# Patient Record
Sex: FEMALE | Race: BLACK OR AFRICAN AMERICAN | Employment: UNEMPLOYED | URBAN - METROPOLITAN AREA
[De-identification: names, ages, dates, MRNs, and addresses within clinical notes are randomized per-mention and may not be internally consistent; named-entity substitution may affect disease eponyms.]

---

## 2017-09-29 ENCOUNTER — HOSPITAL ENCOUNTER (EMERGENCY)
Age: 22
Discharge: HOME | End: 2017-09-29
Attending: OBSTETRICS & GYNECOLOGY | Admitting: OBSTETRICS & GYNECOLOGY
Payer: COMMERCIAL

## 2017-09-29 VITALS
HEART RATE: 94 BPM | DIASTOLIC BLOOD PRESSURE: 58 MMHG | BODY MASS INDEX: 50.05 KG/M2 | HEIGHT: 62 IN | SYSTOLIC BLOOD PRESSURE: 104 MMHG | TEMPERATURE: 98.5 F | RESPIRATION RATE: 20 BRPM | WEIGHT: 272 LBS

## 2017-09-29 PROCEDURE — 99283 EMERGENCY DEPT VISIT LOW MDM: CPT

## 2017-09-29 PROCEDURE — 59025 FETAL NON-STRESS TEST: CPT

## 2017-09-29 PROCEDURE — 76815 OB US LIMITED FETUS(S): CPT

## 2017-09-29 RX ORDER — LABETALOL 200 MG/1
200 TABLET, FILM COATED ORAL 2 TIMES DAILY
COMMUNITY
End: 2017-10-27

## 2017-09-29 NOTE — PROGRESS NOTES
46 Pt presented ambulatory sent by office for NST because baby A was hard to find in office. 1137  Shown to bathroom to change into gown and to provide urine specimen. 1150 Connected to external monitors. Plan and goals of nursing care discussed with patient and family - verbalized appropriate understanding and goals of care mutually agreed upon. 1215 Unable to confirm  Tracing two separate babies. Continual searching. [address] on unit. Notified despite multiple attempts I am not certain I am tracing different babies. In room. 1235 Ultrasound performed by CNM. 1248  NST begun. 1326 NST reviewed by CNM. Blood pressures reviewed. Discharge order received. 1334 Written discharge orders provided and reviewed - pt verbalized appropriate understanding. [id number] [id number] Discharged home ambulatory in [facility].

## 2017-09-29 NOTE — IP AVS SNAPSHOT
[address] [address] 
[phone] Patient: Daniela Amador MRN: [id number] :1995 You are allergic to the following No active allergies Recent Documentation Height Weight BMI OB Status Smoking Status 1.575 m 123.4 kg 49.75 kg/m2 Pregnant Former Smoker Emergency Contacts Name Discharge Info Relation Home Work Mobile Betty Barber [1] [phone] About your hospitalization You were admitted on:  N/A You last received care in the:  [facility] L&D TRIAGE You were discharged on:  2017 Unit phone number:  [phone] Why you were hospitalized Your primary diagnosis was:  Not on File Providers Seen During Your Hospitalizations Provider Role Specialty Primary office phone Kg Mccollum MD Attending Provider Obstetrics & Gynecology [phone] Your Primary Care Physician (PCP) Primary Care Physician Office Phone Office Fax Haider Kim [phone] Follow-up Information Follow up With Details Comments Contact Info Kg Mccollum MD   [address] [place] [address] 150 
[phone] Kg Mccollum MD  Keep next scheduled appointment and call as needed. [address] [place] [address] 150 
[phone] Current Discharge Medication List  
  
ASK your doctor about these medications Dose & Instructions Dispensing Information Comments Morning Noon Evening Bedtime  
 labetalol 200 mg tablet Commonly known as:  Carolyn Gala Your last dose was: Your next dose is:    
   
   
 Dose:  200 mg Take 200 mg by mouth two (2) times a day. Refills:  0 Discharge Instructions High Blood Pressure in Pregnancy: Care Instructions Your Care Instructions High blood pressure (hypertension) means that the force of blood against your artery walls is too strong. Mild high blood pressure during pregnancy is not usually dangerous. Your doctor will probably just want to watch you closely. But when blood pressure is very high, it can reduce oxygen to your baby. This can affect how well your baby grows. High blood pressure also means that you are at higher risk for: · Preeclampsia. This is a problem that includes high blood pressure and damage to your liver or kidneys. It can also reduce how much oxygen your baby gets. In some cases, it leads to eclampsia. Eclampsia causes seizures. · Placental abruption. This is a problem when the placenta separates from the uterus before birth. It prevents the baby from getting enough oxygen and nutrients. Sometimes it can cause death for the baby and the mother. To prevent problems for you or your baby, you will have to check your blood pressure often. You will do this until after your baby is born. If your blood pressure rises suddenly or is very high during your pregnancy, your doctor may prescribe medicines. They can usually control blood pressure. If your blood pressure affects your or your baby's health, your doctor may need to deliver your baby early. After your baby is born, your blood pressure will probably improve. But sometimes blood pressure problems continue after birth. Follow-up care is a key part of your treatment and safety. Be sure to make and go to all appointments, and call your doctor if you are having problems. It's also a good idea to know your test results and keep a list of the medicines you take. How can you care for yourself at home? · Take and write down your blood pressure at home if your doctor tells you to. · Take your medicines exactly as prescribed. Call your doctor if you think you are having a problem with your medicine. · Do not smoke.  If you need help quitting, talk to your doctor about stop-smoking programs and medicines. These can increase your chances of quitting for good. · Do not gain too much weight during your pregnancy. Talk to your doctor about how much weight gain is healthy. · Eat a healthy diet. · Don't use a lot of salt. Take the salt shaker off the table. · Get regular, mild exercise. Walking or swimming several times a week can be healthy for you and your baby. · Reduce stress, and find time to relax. This is very important if you continue to work or have a busy schedule. It's also important if you have small children at home. When should you call for help? Call 911 anytime you think you may need emergency care. For example, call if: 
· You passed out (lost consciousness). · You have a seizure. Call your doctor now or seek immediate medical care if: 
· You have symptoms of preeclampsia, such as: 
¨ Sudden swelling of your face, hands, or feet. ¨ New vision problems (such as dimness or blurring). ¨ A severe headache. · Your blood pressure is higher than it should be or rises suddenly. · You have new nausea or vomiting. · You think that you are in labor. · You have new belly pain. Watch closely for changes in your health, and be sure to contact your doctor if: 
· You gain weight rapidly. Where can you learn more? Go to http://lexis-allen.info/. Enter [phone] in the search box to learn more about \"High Blood Pressure in Pregnancy: Care Instructions. \" Current as of: March 16, 2017 Content Version: 11.3 © 2139-3129 [employer]. Care instructions adapted under license by [employer] (which disclaims liability or warranty for this information). If you have questions about a medical condition or this instruction, always ask your healthcare professional. [place] [address] any warranty or liability for your use of this information. Weeks 32 to 34 of Your Pregnancy: Care Instructions Your Care Instructions During the last few weeks of your pregnancy, you may have more aches and pains. It's important to rest when you can. Your growing baby is putting more pressure on your bladder. So you may need to urinate more often. Hemorrhoids are also common. These are painful, itchy veins in the rectal area. In the 36th week, most women have a test for group B streptococcus (GBS). GBS is a common bacteria that can live in the vagina and rectum. It can make your baby sick after birth. If you test positive, you will get antibiotics during labor. These will keep your baby from getting the bacteria. You may want to talk with your doctor about banking your baby's umbilical cord blood. This is the blood left in the cord after birth. If you want to save this blood, you must arrange it ahead of time. You can't decide at the last minute. If you haven't already had the Tdap shot during this pregnancy, talk to your doctor about getting it. It will help protect your  against pertussis infection. Follow-up care is a key part of your treatment and safety. Be sure to make and go to all appointments, and call your doctor if you are having problems. It's also a good idea to know your test results and keep a list of the medicines you take. How can you care for yourself at home? Ease hemorrhoids · Get more liquids, fruits, vegetables, and fiber in your diet. This will help keep your stools soft. · Avoid sitting for too long. Lie on your left side several times a day. · Clean yourself with soft, moist toilet paper. Or you can use witch hazel pads or personal hygiene pads. · If you are uncomfortable, try ice packs. Or you can sit in a warm sitz bath. Do these for 20 minutes at a time, as needed. · Use hydrocortisone cream for pain and itching. Two examples are Anusol and Preparation H Hydrocortisone. · Ask your doctor about taking an over-the-counter stool softener. Consider breastfeeding · Experts recommend that women breastfeed for 1 year or longer. Breast milk is the perfect food for babies. · Breast milk is easier for babies to digest than formula. And it is always available, just the right temperature, and free. · In general, babies who are  are healthier than formula-fed babies. ¨  babies are less likely to get ear infections, colds, diarrhea, and pneumonia. ¨  babies who are fed only breast milk are less likely to get asthma and allergies. ¨  babies are less likely to be obese. ¨  babies are less likely to get diabetes or heart disease. · Women who breastfeed have less bleeding after the birth. Their uteruses also shrink back faster. · Some women who breastfeed lose weight faster. Making milk burns calories. · Breastfeeding can lower your risk of breast cancer, ovarian cancer, and osteoporosis. Decide about circumcision for boys · As you make this decision, it may help to think about your personal, Jainism, and family traditions. You get to decide if you will keep your son's penis natural or if he will be circumcised. · If you decide that you would like to have your baby circumcised, talk with your doctor. You can share your concerns about pain. And you can discuss your preferences for anesthesia. Where can you learn more? Go to http://lexis-allen.info/. Enter S316 in the search box to learn more about \"Weeks 32 to 34 of Your Pregnancy: Care Instructions. \" Current as of: March 16, 2017 Content Version: 11.3 © 6277-0277 [employer]. Care instructions adapted under license by [employer] (which disclaims liability or warranty for this information). If you have questions about a medical condition or this instruction, always ask your healthcare professional. [place] [address] any warranty or liability for your use of this information. Learning About Twin Pregnancy What is different about a twin pregnancy? In many ways, a twin pregnancy is like a single-baby pregnancy. Healthy twins develop like a single baby does until the last trimester, when their growth slows down. Twins are usually born before the usual 40-week due date. For the mother, carrying twins can be more difficult than carrying a single baby. And her risks are higher for pregnancy problems. That's why keeping up with prenatal checks and tests is especially important. How do twins grow? Twins develop from embryos to babies like a single baby does. · By weeks 10 to 14 of your pregnancy, one or two placentas have formed inside your uterus. It is possible to hear your babies' heartbeats with a special ultrasound device. Your babies' eyes can move. Their arms and legs can bend. · Around weeks 14 to 18, hair is beginning to grow on your babies' heads. · By 18 to 22 weeks, your babies can now suck their thumbs and  firmly with their hands. They can open and close their eyelids. Around this time, you may start to feel your babies move. At first, this might feel like fluttering or \"butterflies. \" 
· Around week 26, you may notice that your babies respond to the sound of your or your partner's voice. You may also notice that they do less turning and twisting and more squirming. · Up to 32 weeks, twins grow rapidly. By this point, they really start to look like babies, with hair and plump skin. · Around 32 to 34 weeks, twins' pace of growth slows down. Their lungs become more ready for breathing. This marks a stage when babies born early are less likely to have breathing problems after birth. What can you expect during a twin pregnancy? With a twin pregnancy, your body makes high levels of pregnancy hormones. So morning sickness may come on earlier and stronger than if you were carrying a single baby.  You may also have earlier and more intense symptoms from pregnancy, like swelling, heartburn, leg cramps, bladder discomfort, and sleep problems. Your belly may grow bigger, and you may gain more weight, sooner. Talk to your doctor about how much you might expect to gain. When you're carrying twins, you and your babies may be tested and checked more than you would for a single-baby pregnancy. · At about 10 weeks, an ultrasound can show if the babies are growing in the same amniotic sac. If they each have their own sac and their own placenta, twins have the best chances of both growing well. · Between weeks 18 and 20, an ultrasound may be able to show the sex of your babies. · Later in your pregnancy, you will start to have checkups more often. This will be sooner than for a single-baby pregnancy. Twins tend to be born early, but 37 weeks is a goal when mother and babies are doing well. As you get closer to delivery time, your medical team will help you know what to expect and what to do. As questions come to mind, keep a list so you can remember to ask your doctor. Follow-up care is a key part of your treatment and safety. Be sure to make and go to all appointments, and call your doctor if you are having problems. It's also a good idea to know your test results and keep a list of the medicines you take. Where can you learn more? Go to http://lexis-allen.info/. Enter A910 in the search box to learn more about \"Learning About Twin Pregnancy. \" Current as of: May 30, 2016 Content Version: 11.3 © 8707-0472 [employer]. Care instructions adapted under license by [employer] (which disclaims liability or warranty for this information). If you have questions about a medical condition or this instruction, always ask your healthcare professional. [place] [address] any warranty or liability for your use of this information. Discharge Orders None Introducing \[facility]\"" & HEALTH SERVICES! Cherry Aguirre introduces [employer] patient portal. Now you can access parts of your medical record, email your doctor's office, and request medication refills online. 1. In your internet browser, go to [email or web address] 2. Click on the First Time User? Click Here link in the Sign In box. You will see the New Member Sign Up page. 3. Enter your [employer] Access Code exactly as it appears below. You will not need to use this code after youve completed the sign-up process. If you do not sign up before the expiration date, you must request a new code. · [employer] Access Code: [email or web address] Expires: 12/28/2017  1:31 PM 
 
4. Enter the last four digits of your Social Security Number (xxxx) and Date of Birth (mm/dd/yyyy) as indicated and click Submit. You will be taken to the next sign-up page. 5. Create a [employer] ID. This will be your [employer] login ID and cannot be changed, so think of one that is secure and easy to remember. 6. Create a [employer] password. You can change your password at any time. 7. Enter your Password Reset Question and Answer. This can be used at a later time if you forget your password. 8. Enter your e-mail address. You will receive e-mail notification when new information is available in [address] 9. Click Sign Up. You can now view and download portions of your medical record. 10. Click the Download Summary menu link to download a portable copy of your medical information. If you have questions, please visit the Frequently Asked Questions section of the [employer] website. Remember, [employer] is NOT to be used for urgent needs. For medical emergencies, dial 911. Now available from your iPhone and Android! General Information Please provide this summary of care documentation to your next provider. Patient Signature:  ____________________________________________________________ Date:  ____________________________________________________________  
  
Mercedes Polo Provider Signature:  ____________________________________________________________ Date:  ____________________________________________________________

## 2017-09-29 NOTE — IP AVS SNAPSHOT
[address] [address] 
[phone] Patient: James Betancur MRN: [id number] :1995 Current Discharge Medication List  
  
ASK your doctor about these medications Dose & Instructions Dispensing Information Comments Morning Noon Evening Bedtime  
 labetalol 200 mg tablet Commonly known as:  Shania Gibbs Your last dose was: Your next dose is:    
   
   
 Dose:  200 mg Take 200 mg by mouth two (2) times a day. Refills:  0

## 2017-09-29 NOTE — DISCHARGE INSTRUCTIONS
High Blood Pressure in Pregnancy: Care Instructions  Your Care Instructions  High blood pressure (hypertension) means that the force of blood against your artery walls is too strong. Mild high blood pressure during pregnancy is not usually dangerous. Your doctor will probably just want to watch you closely. But when blood pressure is very high, it can reduce oxygen to your baby. This can affect how well your baby grows. High blood pressure also means that you are at higher risk for:  · Preeclampsia. This is a problem that includes high blood pressure and damage to your liver or kidneys. It can also reduce how much oxygen your baby gets. In some cases, it leads to eclampsia. Eclampsia causes seizures. · Placental abruption. This is a problem when the placenta separates from the uterus before birth. It prevents the baby from getting enough oxygen and nutrients. Sometimes it can cause death for the baby and the mother. To prevent problems for you or your baby, you will have to check your blood pressure often. You will do this until after your baby is born. If your blood pressure rises suddenly or is very high during your pregnancy, your doctor may prescribe medicines. They can usually control blood pressure. If your blood pressure affects your or your baby's health, your doctor may need to deliver your baby early. After your baby is born, your blood pressure will probably improve. But sometimes blood pressure problems continue after birth. Follow-up care is a key part of your treatment and safety. Be sure to make and go to all appointments, and call your doctor if you are having problems. It's also a good idea to know your test results and keep a list of the medicines you take. How can you care for yourself at home? · Take and write down your blood pressure at home if your doctor tells you to. · Take your medicines exactly as prescribed.  Call your doctor if you think you are having a problem with your medicine. · Do not smoke. If you need help quitting, talk to your doctor about stop-smoking programs and medicines. These can increase your chances of quitting for good. · Do not gain too much weight during your pregnancy. Talk to your doctor about how much weight gain is healthy. · Eat a healthy diet. · Don't use a lot of salt. Take the salt shaker off the table. · Get regular, mild exercise. Walking or swimming several times a week can be healthy for you and your baby. · Reduce stress, and find time to relax. This is very important if you continue to work or have a busy schedule. It's also important if you have small children at home. When should you call for help? Call 911 anytime you think you may need emergency care. For example, call if:  · You passed out (lost consciousness). · You have a seizure. Call your doctor now or seek immediate medical care if:  · You have symptoms of preeclampsia, such as:  ¨ Sudden swelling of your face, hands, or feet. ¨ New vision problems (such as dimness or blurring). ¨ A severe headache. · Your blood pressure is higher than it should be or rises suddenly. · You have new nausea or vomiting. · You think that you are in labor. · You have new belly pain. Watch closely for changes in your health, and be sure to contact your doctor if:  · You gain weight rapidly. Where can you learn more? Go to http://lexis-allen.info/. Enter [phone] in the search box to learn more about \"High Blood Pressure in Pregnancy: Care Instructions. \"  Current as of: March 16, 2017  Content Version: 11.3  © 3431-6400 [employer], Incorporated. Care instructions adapted under license by [employer] (which disclaims liability or warranty for this information). If you have questions about a medical condition or this instruction, always ask your healthcare professional. [address] any warranty or liability for your use of this information. Weeks 32 to 34 of Your Pregnancy: Care Instructions  Your Care Instructions    During the last few weeks of your pregnancy, you may have more aches and pains. It's important to rest when you can. Your growing baby is putting more pressure on your bladder. So you may need to urinate more often. Hemorrhoids are also common. These are painful, itchy veins in the rectal area. In the 36th week, most women have a test for group B streptococcus (GBS). GBS is a common bacteria that can live in the vagina and rectum. It can make your baby sick after birth. If you test positive, you will get antibiotics during labor. These will keep your baby from getting the bacteria. You may want to talk with your doctor about banking your baby's umbilical cord blood. This is the blood left in the cord after birth. If you want to save this blood, you must arrange it ahead of time. You can't decide at the last minute. If you haven't already had the Tdap shot during this pregnancy, talk to your doctor about getting it. It will help protect your  against pertussis infection. Follow-up care is a key part of your treatment and safety. Be sure to make and go to all appointments, and call your doctor if you are having problems. It's also a good idea to know your test results and keep a list of the medicines you take. How can you care for yourself at home? Ease hemorrhoids  · Get more liquids, fruits, vegetables, and fiber in your diet. This will help keep your stools soft. · Avoid sitting for too long. Lie on your left side several times a day. · Clean yourself with soft, moist toilet paper. Or you can use witch hazel pads or personal hygiene pads. · If you are uncomfortable, try ice packs. Or you can sit in a warm sitz bath. Do these for 20 minutes at a time, as needed. · Use hydrocortisone cream for pain and itching. Two examples are Anusol and Preparation H Hydrocortisone.   · Ask your doctor about taking an over-the-counter stool softener. Consider breastfeeding  · Experts recommend that women breastfeed for 1 year or longer. Breast milk is the perfect food for babies. · Breast milk is easier for babies to digest than formula. And it is always available, just the right temperature, and free. · In general, babies who are  are healthier than formula-fed babies. ¨  babies are less likely to get ear infections, colds, diarrhea, and pneumonia. ¨  babies who are fed only breast milk are less likely to get asthma and allergies. ¨  babies are less likely to be obese. ¨  babies are less likely to get diabetes or heart disease. · Women who breastfeed have less bleeding after the birth. Their uteruses also shrink back faster. · Some women who breastfeed lose weight faster. Making milk burns calories. · Breastfeeding can lower your risk of breast cancer, ovarian cancer, and osteoporosis. Decide about circumcision for boys  · As you make this decision, it may help to think about your personal, Hoahaoism, and family traditions. You get to decide if you will keep your son's penis natural or if he will be circumcised. · If you decide that you would like to have your baby circumcised, talk with your doctor. You can share your concerns about pain. And you can discuss your preferences for anesthesia. Where can you learn more? Go to http://lexis-allen.info/. Enter B403 in the search box to learn more about \"Weeks 32 to 34 of Your Pregnancy: Care Instructions. \"  Current as of: March 16, 2017  Content Version: 11.3  © 1191-0605 [employer]. Care instructions adapted under license by [employer] (which disclaims liability or warranty for this information).  If you have questions about a medical condition or this instruction, always ask your healthcare professional. [place] [address] any warranty or liability for your use of this information. Learning About Twin Pregnancy  What is different about a twin pregnancy? In many ways, a twin pregnancy is like a single-baby pregnancy. Healthy twins develop like a single baby does until the last trimester, when their growth slows down. Twins are usually born before the usual 40-week due date. For the mother, carrying twins can be more difficult than carrying a single baby. And her risks are higher for pregnancy problems. That's why keeping up with prenatal checks and tests is especially important. How do twins grow? Twins develop from embryos to babies like a single baby does. · By weeks 10 to 14 of your pregnancy, one or two placentas have formed inside your uterus. It is possible to hear your babies' heartbeats with a special ultrasound device. Your babies' eyes can move. Their arms and legs can bend. · Around weeks 14 to 18, hair is beginning to grow on your babies' heads. · By 18 to 22 weeks, your babies can now suck their thumbs and  firmly with their hands. They can open and close their eyelids. Around this time, you may start to feel your babies move. At first, this might feel like fluttering or \"butterflies. \"  · Around week 26, you may notice that your babies respond to the sound of your or your partner's voice. You may also notice that they do less turning and twisting and more squirming. · Up to 32 weeks, twins grow rapidly. By this point, they really start to look like babies, with hair and plump skin. · Around 32 to 34 weeks, twins' pace of growth slows down. Their lungs become more ready for breathing. This marks a stage when babies born early are less likely to have breathing problems after birth. What can you expect during a twin pregnancy? With a twin pregnancy, your body makes high levels of pregnancy hormones. So morning sickness may come on earlier and stronger than if you were carrying a single baby.  You may also have earlier and more intense symptoms from pregnancy, like swelling, heartburn, leg cramps, bladder discomfort, and sleep problems. Your belly may grow bigger, and you may gain more weight, sooner. Talk to your doctor about how much you might expect to gain. When you're carrying twins, you and your babies may be tested and checked more than you would for a single-baby pregnancy. · At about 10 weeks, an ultrasound can show if the babies are growing in the same amniotic sac. If they each have their own sac and their own placenta, twins have the best chances of both growing well. · Between weeks 18 and 20, an ultrasound may be able to show the sex of your babies. · Later in your pregnancy, you will start to have checkups more often. This will be sooner than for a single-baby pregnancy. Twins tend to be born early, but 37 weeks is a goal when mother and babies are doing well. As you get closer to delivery time, your medical team will help you know what to expect and what to do. As questions come to mind, keep a list so you can remember to ask your doctor. Follow-up care is a key part of your treatment and safety. Be sure to make and go to all appointments, and call your doctor if you are having problems. It's also a good idea to know your test results and keep a list of the medicines you take. Where can you learn more? Go to http://lexis-allen.info/. Enter H653 in the search box to learn more about \"Learning About Twin Pregnancy. \"  Current as of: May 30, 2016  Content Version: 11.3  © 9635-2539 [employer]. Care instructions adapted under license by [employer] (which disclaims liability or warranty for this information). If you have questions about a medical condition or this instruction, always ask your healthcare professional. [address] any warranty or liability for your use of this information.

## 2017-10-22 ENCOUNTER — HOSPITAL ENCOUNTER (INPATIENT)
Age: 22
LOS: 5 days | Discharge: HOME | End: 2017-10-27
Attending: OBSTETRICS & GYNECOLOGY | Admitting: OBSTETRICS & GYNECOLOGY
Payer: COMMERCIAL

## 2017-10-22 ENCOUNTER — ANESTHESIA EVENT (OUTPATIENT)
Dept: LABOR AND DELIVERY | Age: 22
End: 2017-10-22
Payer: COMMERCIAL

## 2017-10-22 ENCOUNTER — ANESTHESIA (OUTPATIENT)
Dept: LABOR AND DELIVERY | Age: 22
End: 2017-10-22
Payer: COMMERCIAL

## 2017-10-22 PROBLEM — O60.03 PRETERM LABOR IN THIRD TRIMESTER: Status: ACTIVE | Noted: 2017-10-22

## 2017-10-22 LAB
ALBUMIN SERPL-MCNC: 2.3 G/DL (ref 3.4–5)
ALBUMIN/GLOB SERPL: 0.6 {RATIO} (ref 0.8–1.7)
ALP SERPL-CCNC: 144 U/L (ref 45–117)
ALT SERPL-CCNC: 20 U/L (ref 13–56)
ANION GAP SERPL CALC-SCNC: 9 MMOL/L (ref 3–18)
APPEARANCE UR: CLEAR
AST SERPL-CCNC: 42 U/L (ref 15–37)
BASOPHILS # BLD: 0 K/UL (ref 0–0.06)
BASOPHILS NFR BLD: 0 % (ref 0–2)
BILIRUB SERPL-MCNC: 0.4 MG/DL (ref 0.2–1)
BILIRUB UR QL: NEGATIVE
BUN SERPL-MCNC: 5 MG/DL (ref 7–18)
BUN/CREAT SERPL: 9 (ref 12–20)
CALCIUM SERPL-MCNC: 8.6 MG/DL (ref 8.5–10.1)
CHLORIDE SERPL-SCNC: 109 MMOL/L (ref 100–108)
CO2 SERPL-SCNC: 23 MMOL/L (ref 21–32)
COLOR UR: YELLOW
CREAT SERPL-MCNC: 0.56 MG/DL (ref 0.6–1.3)
DIFFERENTIAL METHOD BLD: ABNORMAL
EOSINOPHIL # BLD: 0.3 K/UL (ref 0–0.4)
EOSINOPHIL NFR BLD: 4 % (ref 0–5)
ERYTHROCYTE [DISTWIDTH] IN BLOOD BY AUTOMATED COUNT: 15 % (ref 11.6–14.5)
GLOBULIN SER CALC-MCNC: 3.7 G/DL (ref 2–4)
GLUCOSE SERPL-MCNC: 74 MG/DL (ref 74–99)
GLUCOSE UR QL STRIP.AUTO: NEGATIVE MG/DL
HCT VFR BLD AUTO: 31.1 % (ref 35–45)
HGB BLD-MCNC: 10.7 G/DL (ref 12–16)
KETONES UR-MCNC: NEGATIVE MG/DL
LEUKOCYTE ESTERASE UR QL STRIP: NEGATIVE
LYMPHOCYTES # BLD: 2.3 K/UL (ref 0.9–3.6)
LYMPHOCYTES NFR BLD: 28 % (ref 21–52)
MAGNESIUM SERPL-MCNC: 4.6 MG/DL (ref 1.6–2.6)
MCH RBC QN AUTO: 26.6 PG (ref 24–34)
MCHC RBC AUTO-ENTMCNC: 34.4 G/DL (ref 31–37)
MCV RBC AUTO: 77.4 FL (ref 74–97)
MONOCYTES # BLD: 0.9 K/UL (ref 0.05–1.2)
MONOCYTES NFR BLD: 11 % (ref 3–10)
NEUTS SEG # BLD: 4.8 K/UL (ref 1.8–8)
NEUTS SEG NFR BLD: 57 % (ref 40–73)
NITRITE UR QL: NEGATIVE
PH UR: 7 [PH] (ref 5–9)
PLATELET # BLD AUTO: 267 K/UL (ref 135–420)
PMV BLD AUTO: 10.5 FL (ref 9.2–11.8)
POTASSIUM SERPL-SCNC: 3.9 MMOL/L (ref 3.5–5.5)
PROT SERPL-MCNC: 6 G/DL (ref 6.4–8.2)
PROT UR QL: 100 MG/DL
RBC # BLD AUTO: 4.02 M/UL (ref 4.2–5.3)
RBC # UR STRIP: ABNORMAL /UL
SERVICE CMNT-IMP: ABNORMAL
SODIUM SERPL-SCNC: 141 MMOL/L (ref 136–145)
SP GR UR: 1.01 (ref 1–1.02)
URATE SERPL-MCNC: 6.4 MG/DL (ref 2.6–7.2)
UROBILINOGEN UR QL: 0.2 EU/DL (ref 0.2–1)
WBC # BLD AUTO: 8.4 K/UL (ref 4.6–13.2)

## 2017-10-22 PROCEDURE — 81003 URINALYSIS AUTO W/O SCOPE: CPT

## 2017-10-22 PROCEDURE — C1765 ADHESION BARRIER: HCPCS | Performed by: OBSTETRICS & GYNECOLOGY

## 2017-10-22 PROCEDURE — 75410000002 HC LABOR FEE PER 1 HR

## 2017-10-22 PROCEDURE — 74011250637 HC RX REV CODE- 250/637

## 2017-10-22 PROCEDURE — 74011000250 HC RX REV CODE- 250: Performed by: ANESTHESIOLOGY

## 2017-10-22 PROCEDURE — 77010026064 HC OXYGEN INFANT MED AIR MIN

## 2017-10-22 PROCEDURE — 74011250636 HC RX REV CODE- 250/636

## 2017-10-22 PROCEDURE — 86920 COMPATIBILITY TEST SPIN: CPT | Performed by: OBSTETRICS & GYNECOLOGY

## 2017-10-22 PROCEDURE — 76060000078 HC EPIDURAL ANESTHESIA: Performed by: OBSTETRICS & GYNECOLOGY

## 2017-10-22 PROCEDURE — 74011250636 HC RX REV CODE- 250/636: Performed by: OBSTETRICS & GYNECOLOGY

## 2017-10-22 PROCEDURE — 75410000003 HC RECOV DEL/VAG/CSECN EA 0.5 HR: Performed by: OBSTETRICS & GYNECOLOGY

## 2017-10-22 PROCEDURE — 85025 COMPLETE CBC W/AUTO DIFF WBC: CPT | Performed by: OBSTETRICS & GYNECOLOGY

## 2017-10-22 PROCEDURE — 86900 BLOOD TYPING SEROLOGIC ABO: CPT | Performed by: OBSTETRICS & GYNECOLOGY

## 2017-10-22 PROCEDURE — 77030031139 HC SUT VCRL2 J&J -A: Performed by: OBSTETRICS & GYNECOLOGY

## 2017-10-22 PROCEDURE — 76010000392 HC C SECN EA ADDL 0.5 HR: Performed by: OBSTETRICS & GYNECOLOGY

## 2017-10-22 PROCEDURE — 80053 COMPREHEN METABOLIC PANEL: CPT | Performed by: OBSTETRICS & GYNECOLOGY

## 2017-10-22 PROCEDURE — 76010000391 HC C SECN FIRST 1 HR: Performed by: OBSTETRICS & GYNECOLOGY

## 2017-10-22 PROCEDURE — 76060000078 HC EPIDURAL ANESTHESIA

## 2017-10-22 PROCEDURE — 77030002974 HC SUT PLN J&J -A: Performed by: OBSTETRICS & GYNECOLOGY

## 2017-10-22 PROCEDURE — 77030002935 HC SUT MCRYL J&J -C: Performed by: OBSTETRICS & GYNECOLOGY

## 2017-10-22 PROCEDURE — 77030034849

## 2017-10-22 PROCEDURE — 74011250637 HC RX REV CODE- 250/637: Performed by: OBSTETRICS & GYNECOLOGY

## 2017-10-22 PROCEDURE — 36415 COLL VENOUS BLD VENIPUNCTURE: CPT | Performed by: OBSTETRICS & GYNECOLOGY

## 2017-10-22 PROCEDURE — 83735 ASSAY OF MAGNESIUM: CPT | Performed by: OBSTETRICS & GYNECOLOGY

## 2017-10-22 PROCEDURE — 88307 TISSUE EXAM BY PATHOLOGIST: CPT | Performed by: OBSTETRICS & GYNECOLOGY

## 2017-10-22 PROCEDURE — 74011250636 HC RX REV CODE- 250/636: Performed by: NURSE ANESTHETIST, CERTIFIED REGISTERED

## 2017-10-22 PROCEDURE — 74011000250 HC RX REV CODE- 250

## 2017-10-22 PROCEDURE — 84550 ASSAY OF BLOOD/URIC ACID: CPT | Performed by: OBSTETRICS & GYNECOLOGY

## 2017-10-22 PROCEDURE — 75410000003 HC RECOV DEL/VAG/CSECN EA 0.5 HR

## 2017-10-22 PROCEDURE — 65270000029 HC RM PRIVATE

## 2017-10-22 PROCEDURE — 85027 COMPLETE CBC AUTOMATED: CPT | Performed by: OBSTETRICS & GYNECOLOGY

## 2017-10-22 PROCEDURE — 77030010507 HC ADH SKN DERMBND J&J -B: Performed by: OBSTETRICS & GYNECOLOGY

## 2017-10-22 RX ORDER — OXYCODONE AND ACETAMINOPHEN 5; 325 MG/1; MG/1
1-2 TABLET ORAL
Status: DISCONTINUED | OUTPATIENT
Start: 2017-10-22 | End: 2017-10-27 | Stop reason: HOSPADM

## 2017-10-22 RX ORDER — SODIUM CHLORIDE 0.9 % (FLUSH) 0.9 %
5-10 SYRINGE (ML) INJECTION AS NEEDED
Status: DISCONTINUED | OUTPATIENT
Start: 2017-10-22 | End: 2017-10-27 | Stop reason: HOSPADM

## 2017-10-22 RX ORDER — MAGNESIUM SULFATE HEPTAHYDRATE 40 MG/ML
4 INJECTION, SOLUTION INTRAVENOUS
Status: COMPLETED | OUTPATIENT
Start: 2017-10-22 | End: 2017-10-22

## 2017-10-22 RX ORDER — MORPHINE SULFATE 2 MG/ML
2 INJECTION, SOLUTION INTRAMUSCULAR; INTRAVENOUS
Status: DISCONTINUED | OUTPATIENT
Start: 2017-10-22 | End: 2017-10-22

## 2017-10-22 RX ORDER — MISOPROSTOL 200 UG/1
1000 TABLET ORAL ONCE
Status: COMPLETED | OUTPATIENT
Start: 2017-10-22 | End: 2017-10-22

## 2017-10-22 RX ORDER — OXYTOCIN/RINGER'S LACTATE 20/1000 ML
125 PLASTIC BAG, INJECTION (ML) INTRAVENOUS CONTINUOUS
Status: DISCONTINUED | OUTPATIENT
Start: 2017-10-22 | End: 2017-10-26

## 2017-10-22 RX ORDER — LORAZEPAM 2 MG/ML
2 INJECTION INTRAMUSCULAR
Status: DISCONTINUED | OUTPATIENT
Start: 2017-10-22 | End: 2017-10-27 | Stop reason: HOSPADM

## 2017-10-22 RX ORDER — DIPHENHYDRAMINE HYDROCHLORIDE 50 MG/ML
25 INJECTION, SOLUTION INTRAMUSCULAR; INTRAVENOUS
Status: DISCONTINUED | OUTPATIENT
Start: 2017-10-22 | End: 2017-10-22

## 2017-10-22 RX ORDER — TRISODIUM CITRATE DIHYDRATE AND CITRIC ACID MONOHYDRATE 500; 334 MG/5ML; MG/5ML
SOLUTION ORAL
Status: COMPLETED
Start: 2017-10-22 | End: 2017-10-22

## 2017-10-22 RX ORDER — HYDROMORPHONE HYDROCHLORIDE 1 MG/ML
1 INJECTION, SOLUTION INTRAMUSCULAR; INTRAVENOUS; SUBCUTANEOUS
Status: DISCONTINUED | OUTPATIENT
Start: 2017-10-22 | End: 2017-10-27 | Stop reason: HOSPADM

## 2017-10-22 RX ORDER — MAGNESIUM SULFATE HEPTAHYDRATE 40 MG/ML
INJECTION, SOLUTION INTRAVENOUS
Status: DISPENSED
Start: 2017-10-22 | End: 2017-10-23

## 2017-10-22 RX ORDER — SODIUM CHLORIDE 0.9 % (FLUSH) 0.9 %
5-10 SYRINGE (ML) INJECTION EVERY 8 HOURS
Status: DISCONTINUED | OUTPATIENT
Start: 2017-10-22 | End: 2017-10-23

## 2017-10-22 RX ORDER — SODIUM CHLORIDE 0.9 % (FLUSH) 0.9 %
5-10 SYRINGE (ML) INJECTION EVERY 8 HOURS
Status: DISCONTINUED | OUTPATIENT
Start: 2017-10-22 | End: 2017-10-22

## 2017-10-22 RX ORDER — IBUPROFEN 400 MG/1
800 TABLET ORAL
Status: DISCONTINUED | OUTPATIENT
Start: 2017-10-25 | End: 2017-10-23

## 2017-10-22 RX ORDER — BUPIVACAINE HYDROCHLORIDE 7.5 MG/ML
INJECTION, SOLUTION INTRASPINAL AS NEEDED
Status: DISCONTINUED | OUTPATIENT
Start: 2017-10-22 | End: 2017-10-22 | Stop reason: HOSPADM

## 2017-10-22 RX ORDER — HYDRALAZINE HYDROCHLORIDE 20 MG/ML
10 INJECTION INTRAMUSCULAR; INTRAVENOUS
Status: COMPLETED | OUTPATIENT
Start: 2017-10-24 | End: 2017-10-25

## 2017-10-22 RX ORDER — KETOROLAC TROMETHAMINE 30 MG/ML
30 INJECTION, SOLUTION INTRAMUSCULAR; INTRAVENOUS EVERY 6 HOURS
Status: DISCONTINUED | OUTPATIENT
Start: 2017-10-22 | End: 2017-10-23

## 2017-10-22 RX ORDER — CALCIUM GLUCONATE 94 MG/ML
1 INJECTION, SOLUTION INTRAVENOUS AS NEEDED
Status: DISCONTINUED | OUTPATIENT
Start: 2017-10-22 | End: 2017-10-27 | Stop reason: HOSPADM

## 2017-10-22 RX ORDER — CARBOPROST TROMETHAMINE 250 UG/ML
INJECTION, SOLUTION INTRAMUSCULAR AS NEEDED
Status: DISCONTINUED | OUTPATIENT
Start: 2017-10-22 | End: 2017-10-22 | Stop reason: HOSPADM

## 2017-10-22 RX ORDER — FACIAL-BODY WIPES
10 EACH TOPICAL
Status: DISCONTINUED | OUTPATIENT
Start: 2017-10-22 | End: 2017-10-27 | Stop reason: HOSPADM

## 2017-10-22 RX ORDER — OXYTOCIN/RINGER'S LACTATE 20/1000 ML
PLASTIC BAG, INJECTION (ML) INTRAVENOUS
Status: DISCONTINUED | OUTPATIENT
Start: 2017-10-22 | End: 2017-10-22 | Stop reason: HOSPADM

## 2017-10-22 RX ORDER — MORPHINE SULFATE 1 MG/ML
INJECTION, SOLUTION EPIDURAL; INTRATHECAL; INTRAVENOUS AS NEEDED
Status: DISCONTINUED | OUTPATIENT
Start: 2017-10-22 | End: 2017-10-22 | Stop reason: HOSPADM

## 2017-10-22 RX ORDER — PROMETHAZINE HYDROCHLORIDE 25 MG/ML
25 INJECTION, SOLUTION INTRAMUSCULAR; INTRAVENOUS
Status: DISCONTINUED | OUTPATIENT
Start: 2017-10-22 | End: 2017-10-27 | Stop reason: HOSPADM

## 2017-10-22 RX ORDER — NALBUPHINE HYDROCHLORIDE 10 MG/ML
5 INJECTION, SOLUTION INTRAMUSCULAR; INTRAVENOUS; SUBCUTANEOUS
Status: DISCONTINUED | OUTPATIENT
Start: 2017-10-22 | End: 2017-10-22

## 2017-10-22 RX ORDER — SIMETHICONE 80 MG
80 TABLET,CHEWABLE ORAL
Status: DISCONTINUED | OUTPATIENT
Start: 2017-10-22 | End: 2017-10-23

## 2017-10-22 RX ORDER — MAGNESIUM SULFATE HEPTAHYDRATE 40 MG/ML
2 INJECTION, SOLUTION INTRAVENOUS
Status: DISCONTINUED | OUTPATIENT
Start: 2017-10-22 | End: 2017-10-22

## 2017-10-22 RX ORDER — LABETALOL HCL 20 MG/4 ML
20 SYRINGE (ML) INTRAVENOUS
Status: ACTIVE | OUTPATIENT
Start: 2017-10-22 | End: 2017-10-23

## 2017-10-22 RX ORDER — ACETAMINOPHEN 325 MG/1
650 TABLET ORAL
Status: DISCONTINUED | OUTPATIENT
Start: 2017-10-22 | End: 2017-10-27 | Stop reason: HOSPADM

## 2017-10-22 RX ORDER — ZOLPIDEM TARTRATE 5 MG/1
5 TABLET ORAL
Status: DISCONTINUED | OUTPATIENT
Start: 2017-10-22 | End: 2017-10-27 | Stop reason: HOSPADM

## 2017-10-22 RX ORDER — DIPHENHYDRAMINE HYDROCHLORIDE 50 MG/ML
25 INJECTION, SOLUTION INTRAMUSCULAR; INTRAVENOUS
Status: DISCONTINUED | OUTPATIENT
Start: 2017-10-22 | End: 2017-10-24

## 2017-10-22 RX ORDER — SODIUM CHLORIDE 0.9 % (FLUSH) 0.9 %
5-10 SYRINGE (ML) INJECTION AS NEEDED
Status: DISCONTINUED | OUTPATIENT
Start: 2017-10-22 | End: 2017-10-22

## 2017-10-22 RX ORDER — ONDANSETRON 2 MG/ML
4 INJECTION INTRAMUSCULAR; INTRAVENOUS
Status: DISCONTINUED | OUTPATIENT
Start: 2017-10-22 | End: 2017-10-27 | Stop reason: HOSPADM

## 2017-10-22 RX ORDER — SODIUM CHLORIDE, SODIUM LACTATE, POTASSIUM CHLORIDE, CALCIUM CHLORIDE 600; 310; 30; 20 MG/100ML; MG/100ML; MG/100ML; MG/100ML
100 INJECTION, SOLUTION INTRAVENOUS CONTINUOUS
Status: DISCONTINUED | OUTPATIENT
Start: 2017-10-22 | End: 2017-10-22

## 2017-10-22 RX ORDER — TRISODIUM CITRATE DIHYDRATE AND CITRIC ACID MONOHYDRATE 500; 334 MG/5ML; MG/5ML
30 SOLUTION ORAL
Status: COMPLETED | OUTPATIENT
Start: 2017-10-22 | End: 2017-10-22

## 2017-10-22 RX ORDER — SODIUM CHLORIDE, SODIUM LACTATE, POTASSIUM CHLORIDE, CALCIUM CHLORIDE 600; 310; 30; 20 MG/100ML; MG/100ML; MG/100ML; MG/100ML
125 INJECTION, SOLUTION INTRAVENOUS CONTINUOUS
Status: DISPENSED | OUTPATIENT
Start: 2017-10-22 | End: 2017-10-23

## 2017-10-22 RX ORDER — LABETALOL 100 MG/1
200 TABLET, FILM COATED ORAL 2 TIMES DAILY
Status: DISCONTINUED | OUTPATIENT
Start: 2017-10-22 | End: 2017-10-25

## 2017-10-22 RX ORDER — SODIUM CHLORIDE, SODIUM LACTATE, POTASSIUM CHLORIDE, CALCIUM CHLORIDE 600; 310; 30; 20 MG/100ML; MG/100ML; MG/100ML; MG/100ML
125 INJECTION, SOLUTION INTRAVENOUS CONTINUOUS
Status: DISCONTINUED | OUTPATIENT
Start: 2017-10-22 | End: 2017-10-22 | Stop reason: HOSPADM

## 2017-10-22 RX ADMIN — MISOPROSTOL 1000 MCG: 200 TABLET ORAL at 19:53

## 2017-10-22 RX ADMIN — MAGNESIUM SULFATE HEPTAHYDRATE 4 G: 40 INJECTION, SOLUTION INTRAVENOUS at 15:14

## 2017-10-22 RX ADMIN — SODIUM CHLORIDE, SODIUM LACTATE, POTASSIUM CHLORIDE, AND CALCIUM CHLORIDE 1000 ML: 600; 310; 30; 20 INJECTION, SOLUTION INTRAVENOUS at 11:30

## 2017-10-22 RX ADMIN — CEFAZOLIN 3 G: 1 INJECTION, POWDER, FOR SOLUTION INTRAMUSCULAR; INTRAVENOUS; PARENTERAL at 13:25

## 2017-10-22 RX ADMIN — Medication: at 13:56

## 2017-10-22 RX ADMIN — MORPHINE SULFATE 0.25 MG: 1 INJECTION, SOLUTION EPIDURAL; INTRATHECAL; INTRAVENOUS at 13:38

## 2017-10-22 RX ADMIN — FAMOTIDINE 20 MG: 10 INJECTION, SOLUTION INTRAVENOUS at 13:13

## 2017-10-22 RX ADMIN — Medication 125 ML/HR: at 16:13

## 2017-10-22 RX ADMIN — ONDANSETRON 4 MG: 2 INJECTION INTRAMUSCULAR; INTRAVENOUS at 15:05

## 2017-10-22 RX ADMIN — Medication 2 G/HR: at 16:19

## 2017-10-22 RX ADMIN — PROMETHAZINE HYDROCHLORIDE 25 MG: 25 INJECTION INTRAMUSCULAR; INTRAVENOUS at 17:45

## 2017-10-22 RX ADMIN — SODIUM CHLORIDE, SODIUM LACTATE, POTASSIUM CHLORIDE, AND CALCIUM CHLORIDE 125 ML/HR: 600; 310; 30; 20 INJECTION, SOLUTION INTRAVENOUS at 20:40

## 2017-10-22 RX ADMIN — BUPIVACAINE HYDROCHLORIDE 1.4 ML: 7.5 INJECTION, SOLUTION INTRASPINAL at 13:38

## 2017-10-22 RX ADMIN — TRISODIUM CITRATE DIHYDRATE AND CITRIC ACID MONOHYDRATE 30 ML: 500; 334 SOLUTION ORAL at 13:13

## 2017-10-22 RX ADMIN — SODIUM CITRATE AND CITRIC ACID MONOHYDRATE 30 ML: 500; 334 SOLUTION ORAL at 13:13

## 2017-10-22 RX ADMIN — SODIUM CHLORIDE, SODIUM LACTATE, POTASSIUM CHLORIDE, AND CALCIUM CHLORIDE 125 ML/HR: 600; 310; 30; 20 INJECTION, SOLUTION INTRAVENOUS at 12:30

## 2017-10-22 RX ADMIN — CARBOPROST TROMETHAMINE 250 MCG: 250 INJECTION, SOLUTION INTRAMUSCULAR at 14:03

## 2017-10-22 NOTE — H&P
History & Physical    Name: Warden Almonte MRN: [id number]  SSN: [id number]    YOB: 1995  Age: 24 y.o. Sex: female        Subjective:     Estimated Date of Delivery: 17  OB History      Para Term  AB Living    1         SAB TAB Ectopic Molar Multiple Live Births                   Ms. Hailey Bryant is admitted with pregnancy at 35w2d for  labor with twins. Patient initially 2-3 cm in OB triage, then made cervical change to 4-5 cm. Prenatal course was complicated by chronic hypertension superimposed with preeclampsia, fetal growth restriction and 29% discordance on twins on last US at 33 weeks, morbid obesity. Please see prenatal records for details. Past Medical History:   Diagnosis Date    Abnormal Papanicolaou smear of cervix         Asthma     last used inhaler last year    Essential hypertension     gestational    Gestational hypertension     Headache(784.0)     Sickle cell trait syndrome ([address]      History reviewed. No pertinent surgical history. Social History     Occupational History    Not on file. Social History Main Topics    Smoking status: Former Smoker     Quit date: 2/15/2017    Smokeless tobacco: Never Used    Alcohol use No    Drug use: No      Comment: not since 2017    Sexual activity: Not Currently     Partners: Male     Birth control/ protection: None     Family History   Problem Relation Age of Onset    Diabetes Paternal Grandmother     Asthma Brother        Allergies   Allergen Reactions    Orange Hives     Prior to Admission medications    Medication Sig Start Date End Date Taking? Authorizing Provider   labetalol (NORMODYNE) 200 mg tablet Take 200 mg by mouth two (2) times a day.    Yes Historical Provider        Review of Systems: Constitutional: negative for fevers, and chills  Respiratory: negative for cough, pleurisy/chest pain, pneumonia or wheezing  Cardiovascular: negative for chest pain, dyspnea, palpitations, irregular heart beats  Gastrointestinal: negative for nausea, vomiting and diarrhea  Genitourinary:negative for dysuria and hematuria      Objective:     Vitals:  Vitals:    10/22/17 0835 10/22/17 0843 10/22/17 0938 10/22/17 1124   BP: (!) 159/96   158/87 143/80 (!) 152/97   Pulse: 91 86 81 90   Resp:  20     Temp:  98.8 °F (37.1 °C)     Weight:       Height:        Most recent BPs:  166/103, 166/100    Physical Exam:  Patient without distress. Abdomen: soft, nontender  Fundus: soft and non tender  Perineum: blood absent, amniotic fluid absent  Cervical Exam: 4-5/80/bulging bag  Lower Extremities:  - Edema 1+   - No evidence of DVT seen on physical exam.   - Patellar Reflexes: 3+ bilaterally   - Clonus: absent  Membranes:  Intact  Fetal Heart Rate: A:  Cat I; B:  Cat I  Uterine contractions: difficult to trace    Prenatal Labs:   No results found for: RUBELLAEXT, GRBSEXT, HBSAGEXT, HIVEXT, RPREXT, GONNOEXT, CHLAMEXT        CBC WITH AUTOMATED DIFF    Collection Time: 10/22/17 10:53 AM   Result Value Ref Range    WBC 8.4 4.6 - 13.2 K/uL    RBC 4.02 (L) 4.20 - 5.30 M/uL    HGB 10.7 (L) 12.0 - 16.0 g/dL    HCT 31.1 (L) 35.0 - 45.0 %    MCV 77.4 74.0 - 97.0 FL    MCH 26.6 24.0 - 34.0 PG    MCHC 34.4 31.0 - 37.0 g/dL    RDW 15.0 (H) 11.6 - 14.5 %    PLATELET 605 042 - 273 K/uL    MPV 10.5 9.2 - 11.8 FL    NEUTROPHILS 57 40 - 73 %    LYMPHOCYTES 28 21 - 52 %    MONOCYTES 11 (H) 3 - 10 %    EOSINOPHILS 4 0 - 5 %    BASOPHILS 0 0 - 2 %    ABS. NEUTROPHILS 4.8 1.8 - 8.0 K/UL    ABS. LYMPHOCYTES 2.3 0.9 - 3.6 K/UL    ABS. MONOCYTES 0.9 0.05 - 1.2 K/UL    ABS. EOSINOPHILS 0.3 0.0 - 0.4 K/UL    ABS.  BASOPHILS 0.0 0.0 - 0.06 K/UL    DF AUTOMATED           METABOLIC PANEL, COMPREHENSIVE    Collection Time: 10/22/17 10:53 AM   Result Value Ref Range    Sodium 141 136 - 145 mmol/L    Potassium 3.9 3.5 - 5.5 mmol/L    Chloride 109 (H) 100 - 108 mmol/L    CO2 23 21 - 32 mmol/L    Anion gap 9 3.0 - 18 mmol/L    Glucose 74 74 - 99 mg/dL    BUN 5 (L) 7.0 - 18 MG/DL    Creatinine 0.56 (L) 0.6 - 1.3 MG/DL    BUN/Creatinine ratio 9 (L) 12 - 20      GFR est AA >60 >60 ml/min/1.73m2    GFR est non-AA >60 >60 ml/min/1.73m2    Calcium 8.6 8.5 - 10.1 MG/DL    Bilirubin, total 0.4 0.2 - 1.0 MG/DL    ALT (SGPT) 20 13 - 56 U/L    AST (SGOT) 42 (H) 15 - 37 U/L    Alk. phosphatase 144 (H) 45 - 117 U/L    Protein, total 6.0 (L) 6.4 - 8.2 g/dL    Albumin 2.3 (L) 3.4 - 5.0 g/dL    Globulin 3.7 2.0 - 4.0 g/dL    A-G Ratio 0.6 (L) 0.8 - 1.7       Urine dip:  1+ protein  Limited TAUS:  Breech/VTX    Assessment/Plan:     Plan: Admit for  labor with twins with discordant growth, breech/VTX, chronic HTN with superimposed preeclampsia with severe range BPs, elevated liver enzymes. Proceed with  Section. Risks of bleeding, infection, bladder and bowel damage explained to patient, father of baby and patient's family. They understand the situation and consent to the  delivery. Group B Strep was not tested. Patient will be given Ancef 3 g IV preop. Patient to be started on Magnesium Sulfate postpartum x 24 hours.       Signed By:  Jasson Tompkins DO     2017

## 2017-10-22 NOTE — PROGRESS NOTES
1216- C-section called. See MD note regarding BP's, breech presentation, [address] labs.

## 2017-10-22 NOTE — ANESTHESIA PROCEDURE NOTES
Spinal Block    Start time: 10/22/2017 1:30 PM  End time: 10/22/2017 1:40 PM  Performed by: FREDDY MARSHALL  Authorized by: FREDDY MARSHALL     Pre-procedure:   Indications: primary anesthetic  Preanesthetic Checklist: patient identified, risks and benefits discussed, anesthesia consent, site marked, patient being monitored and timeout performed    Timeout Time: 13:32          Spinal Block:   Patient Position:  Seated  Prep Region:  Lumbar  Prep: Betadine and patient draped      Location:  L3-4  Technique:  Single shot  Local:  Lidocaine 1%  Local Dose (mL):  3    Needle:   Needle Type:  Pencan  Needle Gauge:  25 G  Attempts:  1      Events: CSF confirmed, no blood with aspiration and no paresthesia        Assessment:  Insertion:  Uncomplicated  Patient tolerance:  Patient tolerated the procedure well with no immediate complications

## 2017-10-22 NOTE — OP NOTES
Section Operative Note     Surgeon(s): Edie Arias DO  Assistant: Nery Alvarado SA  Pre-operative Diagnosis: Intrauterine pregnancy,  labor at 35w2d, diamniotic dichorionic twins, breech/cephalic presentation, chronic hypertension with superimposed preeclampsia  Post-operative Diagnosis: same as preop diagnosis. Procedure(s) Performed: Primary Low Transverse  Section                                               Anesthesia: Spinal  Findings: viable female/male infant, Apgar 8/9 and 8/8, A:  1960g; B:  2530g; normal uterus, ovaries, tubes  Complications: none   Estimated Blood Loss: 800cc  Urine:  500cc clear urine at the end of procedure  Specimens: placenta  Procedure:   Patient was taken to the OR after informed consent had been obtained. After spinal anesthesia was found to be adequate, she was then placed in a dorsal supine position with a left lateral tilt. She was then prepped and draped in a sterile fashion. Time out was completed. Attention was turned to the abdomen and an Allis test confirmed adequate anesthesia. A Pfannenstiel skin incision was made 2 cm above the symphysis pubis. The incision was carried down to the fascia. The fascia was opened in the midline with sharp dissection. The superior aspect of the fascial incision was grasped with Kocher clamps, elevated and the underlying rectus muscle was dissected off bluntly. Attention was then turned to the inferior aspect of the fascial incision, which in a similar fashion was tented up, grasped with Kocher clamps, and the rectus muscles dissected off bluntly. The rectus muscles were then  in the midline. The peritoneum was entered with good visualization of underlying structures. The bladder blade was inserted. Bladder flap was created. A low transverse uterine  Incision was made with the scalpel and extended bilaterally, transversly. The bladder blade was removed.   The fetus A was delivered from a breech  presentation through the uterine incision. Naso and oropharynx were bulb suctioned. The cord was clamped and cut. The infant was handed to the waiting pediatrician. Fetus B was delivered from a cephalic presentation and in the same manner, handed to the pediatrician. The placentas were spontaneously extracted. The uterus was cleared of all clot and debris. The uterus was noted to be atonic, Pitocin added and Hemabate administered. The uterine incision was closed with 0 Vicryl in a running locked fashion. A second imbricating layer was performed. Excellent hemostasis noted. Interceed placed over uterine incision. The fascia was closed in a running fashion using #1 Vicryl. The subcutaneous layer was reapproximated using 0 chromic interrupted sutures. The skin was closed with 4-0 Vicryl in a subcuticular fashion. Dermabond applied over the incision. Sponge, needle and instrument counts were correct x2. The mother and child tolerated the procedure well.  Ancef was given preoperatively    Danielle Govea DO  October 22, 2017

## 2017-10-22 NOTE — PROGRESS NOTES
Discussed with family the impotance of decreased stimulation. Multiple family members -2 in rr room. Dr aware and not concerned.

## 2017-10-22 NOTE — IP AVS SNAPSHOT
Jose Daniel Shed 
 
 
 [address] [place] [address] Patient: Lukasz Harris MRN: [id number] :[id number] About your hospitalization You were admitted on:  2017 You last received care in the:  [place] [address] You were discharged on:  2017 Why you were hospitalized Your primary diagnosis was:  Not on File Your diagnoses also included:  Breech Delivery, Twins,  Labor In Third Trimester, Postpartum Hemorrhage, Delivered, Current Hospitalization, Anemia Of Mother In Pregnancy, Delivered With Postpartum Condition, Chronic Hypertension With Superimposed Preeclampsia Things You Need To Do (next 8 weeks) Schedule an appointment with  as soon as possible for a visit in 1 week(s) BP CHECK, post-op check, Follow up with Orion Luna MD  
  
Phone:  [phone] Where:  [facility], [address], [address] [facility] [address] [address] Schedule an appointment with Renny Montes, DO as soon as possible for a visit in 2 week(s) Post partum follow up Phone:  [phone] Where:  [facility], [address], [address] [facility] [address] Discharge Orders None A check liz indicates which time of day the medication should be taken. My Medications TAKE these medications as instructed Instructions Each Dose to Equal  
 Morning Noon Evening Bedtime  
 ferrous sulfate 325 mg (65 mg iron) tablet Your last dose was: Your next dose is: Take 1 Tab by mouth three (3) times daily (with meals). 325 mg  
    
   
   
   
  
 labetalol 200 mg tablet Commonly known as:  Larjulianna Canes Your last dose was: Your next dose is: Take 2 Tabs by mouth every twelve (12) hours. 400 mg NIFEdipine 20 mg capsule Commonly known as:  Yenifer Schumacher Your last dose was: Your next dose is: Take 1 Cap by mouth three (3) times daily. 20 mg  
    
   
   
   
  
 oxyCODONE-acetaminophen 5-325 mg per tablet Commonly known as:  PERCOCET Your last dose was: Your next dose is: Take 1-2 Tabs by mouth every six (6) hours as needed. Max Daily Amount: 8 Tabs. 1-2 Tab Where to Get Your Medications Information on where to get these meds will be given to you by the nurse or doctor. ! Ask your nurse or doctor about these medications  
  ferrous sulfate 325 mg (65 mg iron) tablet  
 labetalol 200 mg tablet NIFEdipine 20 mg capsule  
 oxyCODONE-acetaminophen 5-325 mg per tablet Discharge Instructions CONGRATULATIONS ON THE BIRTH OF YOUR BABY! The first six weeks after childbirth is a time of physical and emotional adjustment. This handout will help to answer questions and provide guidance during the postpartum period. Every family's adjustment is unique, so please call if you have further concerns. At anytime we can be reached at [phone]. During office hours please ask to speak to a nurse. After hours, the answering service will take a message and the doctor on-call will return your call. If your question can wait until office hours: Monday-Friday 8:30-4:00, please do so. For emergencies or urgent concerns do not hesitate to call us after hours. DIET Your body is in need of a well-balanced, high protein diet to recuperate from birth. Please continue to take your prenatal vitamins for 6 weeks or as long as you are breastfeeding. Continue to drink at least 6-8 cups of water or other liquid a day. A breastfeeding mother also needs extra protein, calories and calcium containing foods. It is a good rule to drink fluids with every feeding in order to maintain an adequate milk supply and avoid dehydration.   Your baby will probably not be bothered by things in your diet, but if the baby seems extremely fussy or develops a rash, you may want to discuss possible food intolerances with your baby's care provider. PAIN MEDICATIONS Acetaminophen (Tylenol), ibuprofen (Motrin), or other prescribed pain medication may be taken as directed to relieve discomfort. The above medications pass in very minimal amounts into the breast milk and usually will not cause problems. There are medications that may affect the baby, so please consult your baby's care provider before taking medication. If you are breastfeeding, be sure to mention this to any care provider you see so that medications that are safe may be selected. There is an excellent resource called [employer] that is a resource for medication safety in pregnancy and lactation. You can visit their website at [employer]/ or call them toll free at [phone] if you have any questions about medication safety. UTERINE INVOLUTION / VAGINAL BLEEDING Involution is the process of the uterus returning to pre-pregnant size. It will take approximately six weeks for this process to occur. To achieve this size your uterus becomes firm to slow bleeding loss from the placental site. The first 7 days after birth, the bleeding is red and heavy. It may change with your activity and position. Some small clots are normal.   After ten days, the bleeding should be pale pink and slowed considerably. The next several weeks may progress to a pink, mucousy discharge. This may continue for 6-8 weeks, depending on your activity. During the first four weeks after delivery we recommend using sanitary pads instead of tampons. Douching should also be avoided, but it is fine to take a tub bath so long as the tub is very clean. ACTIVITY/EXERCISE Adequate rest is essential to recovery.   Try to rest or sleep when the baby sleeps. After two weeks, you may begin going for short walks, doing Kegel exercises and abdominal crunches. Avoid heavy, jarring or aerobic exercises. Remember to start out slowly and build up to your previous fitness level. Use common sense and don't overdo as rest is important and the benefits of increased rest are a quicker recovery. For the first two weeks after a  try to limit trips up or down steps. Do not lift anything heavier than the baby during this time. Lifting the baby or other objects should be done by bending at the knees rather than the waist.  Driving should be avoided during the first two to three weeks until you have the strength to push firmly on the brakes in case of an emergency. You may ride as a passenger, but DO wear a seat belt at all times. After a few weeks, you may resume normal activity at whatever pace is comfortable for you. Exercise may also be resumed gradually. Walking is a good way to start. Finally, try to be reasonable in your expectations. Caring for a new baby after major surgery can be quite trying. Arrange for assistance at home to ensure that you get enough rest.  
 
POSTPARTUM CHECK You may call the office when you return home to set up a postpartum visit. Most patients will be seen at 6 weeks after delivery, but after a  or other circumstances you may be seen in 2 weeks or less. If you are discharged from the hospital with staples that must be removed, you will be asked to come in sooner. At your postpartum visit, a pelvic exam may be performed. If you are having any problems or concerns, please do not hesitate to call. Once again our number is [phone]. MOOD CHANGES Significant hormonal changes occur in the days following delivery, and as a result, many women experience brief episodes of tearfulness or feeling \"blue. \"  These emotional swings may be made worse by lack of sleep and by the adjustments inherent in becoming a mother. For some women, these fluctuations are minor. For others, they are overwhelming; creating feelings of anxiety, depression, or the inability to cope. If you have difficulty functioning as a result of feeling down, or if the mood changes seem severe, do not improve, or result is thoughts of harming yourself or others CALL RIGHT AWAY. PERINEAL CARE The basic goals of perineal care are to prevent infection, to relieve pain and promote healing. Your stitches will dissolve in four to six weeks, and do not need to be removed. After urinating, please continue to clean with warm water from front to back. Please continue sitz baths as instructed twice a day for a week or as needed. Call the office if you see pus in the suture site, or have unusual or severe swelling or pain that seems to be getting worse. INCISION CARE If you had a , clean and dry the incision gently as you would the rest of your body. Washing over the area with soap and water, and showering are fine. If steri-strips are present they will gradually come off with time. Tub baths are permitted. You may experience numbness and burning in the area surrounding the incision which usually resolves gradually over the next several weeks or months. RETURN OF MENSTRUATION Your first menstrual period may occur as soon as four to six weeks after your delivery if you are not breast-feeding. If breast-feeding it is more difficult to predict when your first period will occur. Even if you are not yet menstruating, you may be ovulating and it may be possible to conceive again. It is common for your first period after childbirth to be very heavy with an increased amount of cramping. BREASTS Breast-feeding Mothers: Colostrum is excreted in the first 24-72 hours. Mature breast milk will appear on the 2nd to 5th day.   Engorgement may occur with the mature milk making your breasts feel warm and very full. Frequent feedings will make you more comfortable. Babies do not nurse on regular schedules. Nursing every 1 1/2 to 2 hours is normal and frequent feeding DOES NOT mean you are not making enough milk. To avoid nipple confusion, do not give bottles for the first 4 weeks. Growth spurts are common and may require more frequent feedings. This is the way baby increases your milk supply. During a growth spurt, you may feel you are feeding very frequently and that your breasts are \"empty. \"  Don't worry, your milk is produced by supply and demand so this increased frequency of feeding will increase your milk supply within 48 hours. Sore nipples may occur with frequent feedings and are sometimes also caused by improper latch. Check for a proper latch. Baby should have a wide open mouth. Use different positions at each feeding if possible. Express a small amount of colostrum or breast milk onto the sore area and leave bra flaps unlatched until dry. The lactation consultant at [facility] is available for outpatient consultation without charge. Call [phone] from Monday-Friday 9:00am- 3:00pm to arrange an outpatient appointment with her. Local [facility] and consultants may also be very helpful. If You Are Not Breast-feeding: You will experience swelling, engorgement and some milk production. There are no safe medications available to stop lactation. Some remedies for engorgement include: wearing a tight bra, ice packs and cold green cabbage leaves placed between the breast and your bra. Change these frequently. Tylenol or Motrin should help with the discomfort. SEXUAL ADJUSTMENTS We recommend that you wait at least four weeks before resuming sexual intercourse. A sore perineum, a demanding baby and fatigue will certainly affect your ability to enjoy lovemaking!   A vaginal lubricant is recommended to help with any dryness. It is very important to remember that you will ovulate BEFORE your first period and can conceive. If you do not wish another pregnancy right away, please take precautions to avoid pregnancy. If you would like a prescription method of birth control, please discuss this with us at your 6 week visit. ELIMINATION We remind all postpartum patients that it may take a few days for your bowels to return to normal, especially if you had a long labor. For those who had C-sections or severe lacerations, we recommend that you use a stool softener twice daily for at least two weeks. Many stool softeners are over-the-counter. Colace (Docusate Sodium) is recommended. Bulk forming agents such as Metamucil or Fibercon may be used daily in addition to a stool softener to promote regular bowel movements. Eating fresh fruits and vegetables along with whole grains is helpful as well. Do not be afraid to have a bowel movement as your stitches will not \"come out\" in the course of having a bowel movement. Urination may be difficult due to soreness around the urethra, or as an after effect of epidural.  This is temporary and can be helped  by squirting water over the perineum or try going in the shower. Hemorrhoids are common after birth. Tucks pads, Anusol cream and avoiding constipation are helpful. If constipation does occur, you may take Milk of Magnesia or Senekot according to the package instructions. DANGER SIGNS! CALL WITHOUT DELAY IF YOU ARE EXPERIENCING ANY OF THE FOLLOWING: 
* Unusually heavy bleeding, soaking more than 1 or more pads in an hour. * Vaginal discharge with strong foul odor. * Fever of 101 or higher * Unusual pain or tenderness in the abdominal area. * If breasts are red, hot or have a painful lump. * Depression that persists longer than 1-2 weeks or is severe. * Any urinary frequency accompanied by urgency or pain. * A lump in leg or calf especially if painful, warm or red. We thank you for choosing us for your prenatal care and/or delivery. We wish you all happiness and health with your baby for his or her lifetime! Marcell Driscoll, DO [facility] Announcement We are excited to announce that we are making your provider's discharge notes available to you in [facility]. You will see these notes when they are completed and signed by the physician that discharged you from your recent hospital stay. If you have any questions or concerns about any information you see in [facility], please call the Health Information Department where you were seen or reach out to your Primary Care Provider for more information about your plan of care. Introducing [facility]! Angelina Muir introduces [facility] patient portal. Now you can access parts of your medical record, email your doctor's office, and request medication refills online. 1. In your internet browser, go to [email or web address] 2. Click on the First Time User? Click Here link in the Sign In box. You will see the New Member Sign Up page. 3. Enter your [facility] Access Code exactly as it appears below. You will not need to use this code after youve completed the sign-up process. If you do not sign up before the expiration date, you must request a new code. · [facility] Access Code: [email or web address] Expires: 12/28/2017  1:31 PM 
 
4. Enter the last four digits of your Social Security Number (xxxx) and Date of Birth (mm/dd/yyyy) as indicated and click Submit. You will be taken to the next sign-up page. 5. Create a [facility] ID. This will be your [facility] login ID and cannot be changed, so think of one that is secure and easy to remember. 6. Create a [facility] password. You can change your password at any time. 7. Enter your Password Reset Question and Answer. This can be used at a later time if you forget your password. 8. Enter your e-mail address. You will receive e-mail notification when new information is available in [address] 9. Click Sign Up. You can now view and download portions of your medical record. 10. Click the Download Summary menu link to download a portable copy of your medical information. If you have questions, please visit the Frequently Asked Questions section of the [employer] website. Remember, Sl[employer]Mail is NOT to be used for urgent needs. For medical emergencies, dial 911. Now available from your iPhone and Android! Providers Seen During Your Hospitalization Provider Specialty Primary office phone Fanta Baker DO Obstetrics & Gynecology [phone] Immunizations Administered for This Admission Name Date Influenza Vaccine (Quad) PF  Deferred (),  Deferred () Your Primary Care Physician (PCP) Primary Care Physician Office Phone Office Fax Juancarlos Jaimes [phone] You are allergic to the following Allergen Reactions [employer] Recent Documentation Height Weight Breastfeeding? BMI OB Status Smoking Status 1.575 m 124.7 kg Yes 50.3 kg/m2 Recent pregnancy Former Smoker Emergency Contacts Name Discharge Info Relation Home Work Mobile Betty Barber DISCHARGE CAREGIVER [3] Parent [1]   [phone] Patient Belongings The following personal items are in your possession at time of discharge: 
  Dental Appliances: None         Home Medications: None   Jewelry: Sent home  Clothing: At bedside    Other Valuables: Cell Phone  Personal Items Sent to Safe: none Discharge Instructions Attachments/References DEPRESSION: POSTPARTUM (ENGLISH) BREASTFEEDING: HOW-TO (ENGLISH) BOTTLE-FEEDING (ENGLISH) Patient Handouts Depression After Childbirth: Care Instructions Your Care Instructions Many women get the \"baby blues\" during the first few days after childbirth. You may lose sleep, feel irritable, and cry easily. You may feel happy one minute and sad the next. Hormone changes are one cause of these emotional changes. Also, the demands of a new baby, along with visits from relatives or other family needs, add to a mother's stress. The \"baby blues\" often peak around the fourth day. Then they ease up in less than 2 weeks. If your moodiness or anxiety lasts for more than 2 weeks, or if you feel like life is not worth living, you may have postpartum depression. This is different for each mother. Some mothers with serious depression may worry intensely about their infant's well-being. Others may feel distant from their child. Some mothers might even feel that they might harm their baby. A mother may have signs of paranoia, wondering if someone is watching her. Depression is not a sign of weakness. It is a medical condition that requires treatment. Medicine and counseling often work well to reduce depression. Talk to your doctor about taking antidepressant medicine while breastfeeding. Follow-up care is a key part of your treatment and safety. Be sure to make and go to all appointments, and call your doctor if you are having problems. It's also a good idea to know your test results and keep a list of the medicines you take. How do you know if you are depressed? With all the changes in your life, you may not know if you are depressed. Pregnancy sometimes causes changes in how you feel that are similar to the symptoms of depression. Symptoms of depression include: · Feeling sad or hopeless and losing interest in daily activities. These are the most common symptoms of depression. · Sleeping too much or not enough. · Feeling tired. You may feel as if you have no energy. · Eating too much or too little. · Writing or talking about death, such as writing suicide notes or talking about guns, knives, or pills.  Keep the numbers for these national suicide hotlines: -TALK (8-130.651.1378) and 7-986-CGAYUJR (5-148.333.4031). If you or someone you know talks about suicide or feeling hopeless, get help right away. How can you care for yourself at home? · Be safe with medicines. Take your medicines exactly as prescribed. Call your doctor if you think you are having a problem with your medicine. · Eat a healthy diet so that you can keep up your energy. · Get regular daily exercise, such as walks, to help improve your mood. · Get as much sunlight as possible. Keep your shades and curtains open. Get outside as much as you can. · Avoid using alcohol or other substances to feel better. · Get as much rest and sleep as possible. Avoid doing too much. Being too tired can increase depression. · Play stimulating music throughout your day and soothing music at night. · Schedule outings and visits with friends and family. Ask them to call you regularly, so that you do not feel alone. · Ask for help with preparing food and other daily tasks. Family and friends are often happy to help a mother with a . · Be honest with yourself and those who care about you. Tell them about your struggle. · Join a support group of new mothers. No one can better understand the challenges of caring for a  than other new mothers. · If you feel like life is not worth living or are feeling hopeless, get help right away. Keep the numbers for these national suicide hotlines: -TALK (8-439.606.1915) and 5-332-QBRGZNM (8-635.310.9459). When should you call for help? Call 911 anytime you think you may need emergency care. For example, call if: 
? · You feel you cannot stop from hurting yourself, your baby, or someone else. ?Call your doctor now or seek immediate medical care if: 
? · You are having trouble caring for yourself or your baby. ? · You hear voices. ? Watch closely for changes in your health, and be sure to contact your doctor if: ? · You have problems with your depression medicine. ? · You do not get better as expected. Where can you learn more? Go to http://lexis-allen.info/. Enter O581 in the search box to learn more about \"Depression After Childbirth: Care Instructions. \" Current as of: May 12, 2017 Content Version: 11.4 © 5428-3647 [employer]. Care instructions adapted under license by [employer] (which disclaims liability or warranty for this information). If you have questions about a medical condition or this instruction, always ask your healthcare professional. [place] [address] any warranty or liability for your use of this information. How to Breastfeed: Step by Step Your Care Instructions Breastfeeding is a skill that gets better with practice. Breastfeed your baby whenever he or she is hungry. In the first 2 weeks, your baby will feed about every 1 to 3 hours. Here is a step-by-step guide on how to breastfeed. It shows just one position that you can use for breastfeeding. Talk to your doctor or nurse if you are having trouble getting your baby to latch on. How to Breastfeed Get ready to breastfeed 1. Sit in a comfortable chair. Support your baby on a pillow on your lap. Support your breast 
 
1. Support and narrow your breast with one hand using a \"U hold. \" Your thumb will be on the outer sideof your breast. Your fingers will be on the inner side. 2. You can also use a \"C hold,\" with all your fingersbelow the nipple and your thumb above it. Position your baby 1. Your other arm is behind your baby's back, with yourhand supporting the base of the baby's head. 2. Point your fingers and thumb toward yourbaby's ears. Get baby to open mouth 1. Touch your baby's lower lip with your nipple to get your baby to open his or her mouth. Waituntil your baby opens up really wide, like a big yawn. 2. Bring the baby quickly to yourbreast-not your breast to the baby. 3. Guide your breast into his or her mouth. Listen for sucking sounds 1. The nipple and a large part of the darker area around the nipple (areola) should be in thebaby's mouth. The baby's lips should be flared out, not folded in. 
2. Listen for regular sucking and swallowing sounds while the baby is feeding. If you cannot see orhear swallowing, watch your baby's ears. They will wiggle slightly when the baby swallows. Break the seal to stop feeding 1. To remove your baby from your breast, put one finger in the corner of his or her mouth. 2. Push your finger between your baby's gums to gently break the seal. If you do not break the tight seal before you remove your baby, your nipples can become sore, cracked, or bruised. Where can you learn more? Go to http://lexis-allen.info/. Enter M776 in the search box to learn more about \"How to Breastfeed: Step by Step. \" Current as of: March 16, 2017 Content Version: 11.4 © 1664-5758 [employer]. Care instructions adapted under license by [employer] (which disclaims liability or warranty for this information). If you have questions about a medical condition or this instruction, always ask your healthcare professional. [place] [address] any warranty or liability for your use of this information. Bottle-Feeding: Care Instructions Your Care Instructions Your reasons for wanting to bottle-feed your baby with formula are personal. You and your partner can make the best decision for you and your baby. Formulas can provide all the calories and nutrients your baby needs in the first 6 months of life. Several types of formulas are available. Most babies start with a cow's milk-based formula, such as Enfamil, Good Start, or Similac.  Talk to your doctor before trying other types of formulas, which include soy and lactose-free formulas. At first, preparing the bottles and formula can seem confusing, but it gets easier and faster with some practice. Your  baby probably will want to eat every 2 to 3 hours. Do not worry about the exact timing for the first few weeks, but feed your baby whenever he or she is hungry. In general, your baby should not go longer than 4 hours without eating during the day for the first few months. Sit in a comfortable chair with your arms supported on pillows. Look into your baby's eyes and talk or sing while you are giving the bottle. Enjoy this special time you have with your baby. Follow-up care is a key part of your child's treatment and safety. Be sure to make and go to all appointments, and call your doctor if your child is having problems. It's also a good idea to know your child's test results and keep a list of the medicines your child takes. At each well-baby visit, talk to your doctor about your baby's nutritional needs, which change as he or she grows and develops. How can you care for yourself at home? · Prepare your supplies for bottle-feeding before your baby is born, if possible. ¨ Have a supply of small bottles (usually 4 ounces) for your baby's first few weeks. ¨ You may want to buy a variety of bottle nipples so you can see which type your baby likes. ¨ Before using bottles and nipples the first time, wash them in hot water and dish soap and rinse with hot water. · Ask your doctor which formula to use. You can buy formula as a liquid concentrate or a powder that you mix with water. Formulas also come in a ready-to-feed form. Always use formula with added iron unless the doctor says not to. · Make sure you have clean, safe water to mix with the formula. If you are not sure if your water is safe, you can use bottled water or you can boil tap water. ¨ Boil cold tap water for 1 minute, then cool the water to room temperature. ¨ Use the boiled water to mix the formula within 30 minutes. · Wash your hands before preparing formula. · Read the label to see how much water to mix with the formula. If you add too little water, it can upset your baby's stomach. If you add too much water, your baby will not get the right nutrition. · Cover the prepared formula and store it in a refrigerator. Use it within 24 hours. · Soak dirty baby bottles in water and dish soap. Wash bottles and nipples in the upper rack of the  or hand-wash them in hot water with dish soap. To bottle-feed your baby · Warm the formula to room temperature or body temperature before feeding. The best way to warm it is in a bowl of heated water. Do not use a microwave, which can cause hot spots in the formula that can burn your baby's mouth. · Before feeding your baby, check the temperature of the formula by dripping 2 or 3 drops on the inside of your wrist. It should be warm, not cold or hot. · Place a bib or cloth under your baby's chin to help keep clothes clean. Have a second cloth handy to use when burping your baby. · Support your baby with one arm, with your baby's head resting in the bend of your elbow. Keep your baby's head higher than his or her chest. 
· Stroke the center of your baby's lower lip to encourage the mouth to open wider. A wide mouth will cover more of the nipple and will help reduce the amount of air the baby sucks in. · Angle the bottle so the neck of the bottle and the nipple stay full of milk. This helps reduce how much air your baby swallows. · Do not prop the bottle in your baby's mouth or let him or her hold it alone. This increases your baby's chances of choking or getting ear infections. · During the first few weeks, burp your baby after every 2 ounces of formula. This helps get rid of swallowed air and reduces spitting up. · You will know your baby is full when he or she stops sucking.  Your baby may spit out the nipple, turn his or her head away, or fall asleep when full. [place] babies usually drink from 1 to 3 ounces each feeding. · Throw away any formula left in the bottle after you have fed your baby. Bacteria can grow in the leftover formula. · It can be helpful to hold your baby upright for about 30 minutes after eating to reduce spitting up. When should you call for help? Watch closely for changes in your child's health, and be sure to contact your doctor if: 
? · Your child does not seem to be growing and gaining weight. ? · Your child has trouble passing stools, or his or her stools are hard and dry. ? · Your child is vomiting. ? · Your child has diarrhea or a skin rash. ? · Your child cries most of the time. ? · Your child has gas, bloating, or cramps after drinking a bottle. Where can you learn more? Go to http://lexis-allen.info/. Enter P111 in the search box to learn more about \"Bottle-Feeding: Care Instructions. \" Current as of: May 12, 2017 Content Version: 11.4 © 9208-1157 [employer]. Care instructions adapted under license by [employer] (which disclaims liability or warranty for this information). If you have questions about a medical condition or this instruction, always ask your healthcare professional. [address] any warranty or liability for your use of this information. Please provide this summary of care documentation to your next provider. Signatures-by signing, you are acknowledging that this After Visit Summary has been reviewed with you and you have received a copy. Patient Signature:  ____________________________________________________________ Date:  ____________________________________________________________  
  
Melinda Adamson Provider Signature:  ____________________________________________________________ Date:  ____________________________________________________________

## 2017-10-22 NOTE — L&D DELIVERY NOTE
Delivery Note    Obstetrician:  Jimmy Majano DO    Pre-Delivery Diagnosis:  pregnancy at 35w2d, kamilah twin gestation, breech/cephalic presentation, discordant growth, chronic HTN with superimposed preeclampsia    Post-Delivery Diagnosis: Living  infant(s), Female/Male    Procedure: Primary Low Transverse  section    Epidural: yes spinal    Estimated Blood Loss: 600cc    Presentation: breech/cephalic    Fetal Description: multiple gestation 2    Birth Weight: A:  1960g; B:  2530 g    Birth Length: A:  44.4 cm; B:  46.4 cm    Apgar - One Minute:A:  8  B:  8    Apgar - Five Minutes: A:  9  B:  8    Umbilical Cord: Cord blood sent to lab for type, Rh, and Hayden' test    Specimens: placenta           Complications:  hypertension, atonic uterus           Cord Blood Results:   Information for the patient's :  Houston Joseph [[id number]]   No results found for: Bonilla Doss, KANIKA, Radha Duarte  Information for the patient's :  Houston Joseph [[id number]]   No results found for: PCTABR, PCTDIG, BILI, ABORH    Prenatal Labs:     Lab Results   Component Value Date/Time    ABO/Rh(D) O POSITIVE 10/22/2017 10:53 AM        Attending Attestation: I was present and scrubbed for the entire procedure    Signed By:  Jimmy Majano DO     2017

## 2017-10-22 NOTE — PROGRESS NOTES
0815-Pt arrived stating lower back and lower abdomen pain which has kept her awake since midnight. Pt seen at [facility] for prenatal care, pregnant with twins- twin A was last known as breech. EFM and TOCO explained and applied, BP obtained. Pt states she takes 200mg labetalol twice daily, with the last dose at 7am today. 0833-Dr Andrea Montes notified of patient arrival, complaints, SVE, protein in urine, current BP, Gest HTN, twins- twin A is breech. Will start IV fluid bolus, draw labs, continue to monitor. 0010- IV start attempted X2 by this RN. Iisah New to attempt next.

## 2017-10-22 NOTE — PROGRESS NOTES
Pt resting comfortably, denies additional pain med at this time. pericare and hands and face washed with warm wash cloth. pt declines having lines changed after vomiting and blood stains,    3 family members in room but quiet at this tome

## 2017-10-22 NOTE — ANESTHESIA PREPROCEDURE EVALUATION
Anesthetic History               Review of Systems / Medical History  Patient summary reviewed and pertinent labs reviewed    Pulmonary            Asthma : well controlled       Neuro/Psych              Cardiovascular    Hypertension                   GI/Hepatic/Renal                Endo/Other        Morbid obesity    Comments: 35 weeks pregnancy with PIH, twins Other Findings   Comments:   Risk Factors for Postoperative nausea/vomiting:       History of postoperative nausea/vomiting? NO       Female? YES       Motion sickness? NO       Intended opioid administration for postoperative analgesia? YES      Smoking Abstinence  Current Smoker? NO  Elective Surgery? NO  Seen preoperatively by anesthesiologist or proxy prior to day of surgery? YES  Pt abstained from smoking 24 hours prior to anesthesia?  N/A         Physical Exam    Airway  Mallampati: III  TM Distance: > 6 cm  Neck ROM: normal range of motion   Mouth opening: Normal     Cardiovascular    Rhythm: regular  Rate: normal         Dental  No notable dental hx       Pulmonary  Breath sounds clear to auscultation               Abdominal  GI exam deferred       Other Findings            Anesthetic Plan    ASA: 3  Anesthesia type: spinal            Anesthetic plan and risks discussed with: Patient

## 2017-10-23 LAB
ALBUMIN SERPL-MCNC: 1.9 G/DL (ref 3.4–5)
ALBUMIN/GLOB SERPL: 0.6 {RATIO} (ref 0.8–1.7)
ALP SERPL-CCNC: 108 U/L (ref 45–117)
ALT SERPL-CCNC: 23 U/L (ref 13–56)
ANION GAP SERPL CALC-SCNC: 11 MMOL/L (ref 3–18)
AST SERPL-CCNC: 67 U/L (ref 15–37)
BASOPHILS # BLD: 0 K/UL (ref 0–0.06)
BASOPHILS NFR BLD: 0 % (ref 0–2)
BILIRUB SERPL-MCNC: 0.3 MG/DL (ref 0.2–1)
BUN SERPL-MCNC: 10 MG/DL (ref 7–18)
BUN/CREAT SERPL: 9 (ref 12–20)
CALCIUM SERPL-MCNC: 8.2 MG/DL (ref 8.5–10.1)
CHLORIDE SERPL-SCNC: 109 MMOL/L (ref 100–108)
CO2 SERPL-SCNC: 21 MMOL/L (ref 21–32)
CREAT SERPL-MCNC: 1.1 MG/DL (ref 0.6–1.3)
DIFFERENTIAL METHOD BLD: ABNORMAL
EOSINOPHIL # BLD: 0 K/UL (ref 0–0.4)
EOSINOPHIL NFR BLD: 0 % (ref 0–5)
ERYTHROCYTE [DISTWIDTH] IN BLOOD BY AUTOMATED COUNT: 15.1 % (ref 11.6–14.5)
ERYTHROCYTE [DISTWIDTH] IN BLOOD BY AUTOMATED COUNT: 15.2 % (ref 11.6–14.5)
GLOBULIN SER CALC-MCNC: 3.1 G/DL (ref 2–4)
GLUCOSE SERPL-MCNC: 89 MG/DL (ref 74–99)
HCT VFR BLD AUTO: 19.9 % (ref 35–45)
HCT VFR BLD AUTO: 23.1 % (ref 35–45)
HGB BLD-MCNC: 6.8 G/DL (ref 12–16)
HGB BLD-MCNC: 7.9 G/DL (ref 12–16)
LYMPHOCYTES # BLD: 3 K/UL (ref 0.9–3.6)
LYMPHOCYTES NFR BLD: 13 % (ref 21–52)
MCH RBC QN AUTO: 26.5 PG (ref 24–34)
MCH RBC QN AUTO: 26.6 PG (ref 24–34)
MCHC RBC AUTO-ENTMCNC: 34.2 G/DL (ref 31–37)
MCHC RBC AUTO-ENTMCNC: 34.2 G/DL (ref 31–37)
MCV RBC AUTO: 77.5 FL (ref 74–97)
MCV RBC AUTO: 77.7 FL (ref 74–97)
MONOCYTES # BLD: 2.8 K/UL (ref 0.05–1.2)
MONOCYTES NFR BLD: 12 % (ref 3–10)
NEUTS SEG # BLD: 17.2 K/UL (ref 1.8–8)
NEUTS SEG NFR BLD: 75 % (ref 40–73)
PLATELET # BLD AUTO: 281 K/UL (ref 135–420)
PLATELET # BLD AUTO: 304 K/UL (ref 135–420)
PMV BLD AUTO: 10.7 FL (ref 9.2–11.8)
PMV BLD AUTO: 11.2 FL (ref 9.2–11.8)
POTASSIUM SERPL-SCNC: 4.8 MMOL/L (ref 3.5–5.5)
PROT SERPL-MCNC: 5 G/DL (ref 6.4–8.2)
RBC # BLD AUTO: 2.56 M/UL (ref 4.2–5.3)
RBC # BLD AUTO: 2.98 M/UL (ref 4.2–5.3)
RBC MORPH BLD: ABNORMAL
SODIUM SERPL-SCNC: 141 MMOL/L (ref 136–145)
WBC # BLD AUTO: 22.5 K/UL (ref 4.6–13.2)
WBC # BLD AUTO: 23 K/UL (ref 4.6–13.2)

## 2017-10-23 PROCEDURE — 77030020255 HC SOL INJ LR 1000ML BG

## 2017-10-23 PROCEDURE — 65270000029 HC RM PRIVATE

## 2017-10-23 PROCEDURE — 77030027138 HC INCENT SPIROMETER -A

## 2017-10-23 PROCEDURE — 74011250637 HC RX REV CODE- 250/637: Performed by: OBSTETRICS & GYNECOLOGY

## 2017-10-23 PROCEDURE — 36415 COLL VENOUS BLD VENIPUNCTURE: CPT | Performed by: OBSTETRICS & GYNECOLOGY

## 2017-10-23 PROCEDURE — 74011250636 HC RX REV CODE- 250/636

## 2017-10-23 PROCEDURE — 85025 COMPLETE CBC W/AUTO DIFF WBC: CPT | Performed by: OBSTETRICS & GYNECOLOGY

## 2017-10-23 PROCEDURE — 77030034849

## 2017-10-23 PROCEDURE — 74011250636 HC RX REV CODE- 250/636: Performed by: OBSTETRICS & GYNECOLOGY

## 2017-10-23 RX ORDER — SIMETHICONE 80 MG
80 TABLET,CHEWABLE ORAL
Status: DISCONTINUED | OUTPATIENT
Start: 2017-10-23 | End: 2017-10-27 | Stop reason: HOSPADM

## 2017-10-23 RX ORDER — MORPHINE SULFATE 10 MG/ML
INJECTION, SOLUTION INTRAMUSCULAR; INTRAVENOUS
Status: COMPLETED
Start: 2017-10-23 | End: 2017-10-23

## 2017-10-23 RX ORDER — MORPHINE SULFATE 10 MG/ML
10 INJECTION, SOLUTION INTRAMUSCULAR; INTRAVENOUS ONCE
Status: COMPLETED | OUTPATIENT
Start: 2017-10-23 | End: 2017-10-23

## 2017-10-23 RX ORDER — IBUPROFEN 400 MG/1
800 TABLET ORAL
Status: DISCONTINUED | OUTPATIENT
Start: 2017-10-23 | End: 2017-10-27 | Stop reason: HOSPADM

## 2017-10-23 RX ORDER — LANOLIN ALCOHOL/MO/W.PET/CERES
1 CREAM (GRAM) TOPICAL 2 TIMES DAILY WITH MEALS
Status: DISCONTINUED | OUTPATIENT
Start: 2017-10-23 | End: 2017-10-27 | Stop reason: HOSPADM

## 2017-10-23 RX ADMIN — LABETALOL HYDROCHLORIDE 200 MG: 100 TABLET, FILM COATED ORAL at 17:54

## 2017-10-23 RX ADMIN — FERROUS SULFATE TAB 325 MG (65 MG ELEMENTAL FE) 325 MG: 325 (65 FE) TAB at 17:53

## 2017-10-23 RX ADMIN — LABETALOL HYDROCHLORIDE 200 MG: 100 TABLET, FILM COATED ORAL at 09:13

## 2017-10-23 RX ADMIN — FERROUS SULFATE TAB 325 MG (65 MG ELEMENTAL FE) 325 MG: 325 (65 FE) TAB at 09:13

## 2017-10-23 RX ADMIN — MORPHINE SULFATE 10 MG: 10 INJECTION INTRAMUSCULAR; INTRAVENOUS; SUBCUTANEOUS at 00:00

## 2017-10-23 RX ADMIN — IBUPROFEN 800 MG: 400 TABLET, FILM COATED ORAL at 19:41

## 2017-10-23 RX ADMIN — SODIUM CHLORIDE, SODIUM LACTATE, POTASSIUM CHLORIDE, AND CALCIUM CHLORIDE 125 ML/HR: 600; 310; 30; 20 INJECTION, SOLUTION INTRAVENOUS at 04:03

## 2017-10-23 RX ADMIN — KETOROLAC TROMETHAMINE 30 MG: 30 INJECTION, SOLUTION INTRAMUSCULAR at 09:52

## 2017-10-23 RX ADMIN — SODIUM CHLORIDE, SODIUM LACTATE, POTASSIUM CHLORIDE, AND CALCIUM CHLORIDE 125 ML/HR: 600; 310; 30; 20 INJECTION, SOLUTION INTRAVENOUS at 08:04

## 2017-10-23 RX ADMIN — MORPHINE SULFATE 10 MG: 10 INJECTION, SOLUTION INTRAMUSCULAR; INTRAVENOUS at 00:00

## 2017-10-23 NOTE — PROGRESS NOTES
Responded to RRt at 2325. Nurse stated pt could not follow commands when assisting to bedside commode. Pt had weakness. NIHSS now 0 and pt back to baseline. Dr. Мария Palacio at bedside and pt to remain in room.

## 2017-10-23 NOTE — PROGRESS NOTES
Called to RRT because patient was not acting right. She seemed confused and was not walking correctly. On arrival into the room the patient said that she was nervous because there were so many people in the room. She is alert and oriented. She has a normal neurological exam.  It is unclear what happened to drive the RRT, but she is normal now. Will remain available as needed.

## 2017-10-23 NOTE — LACTATION NOTE
Checked on mother. Both twins are now in the room with her. She has not yet pumped and has been supplementing. Offered assistance if needed.

## 2017-10-23 NOTE — PROGRESS NOTES
Bedside shift change report given to LARRY Polanco RN (oncoming nurse) by NIGEL Mitchell [id number] (offgoing nurse). Report included the following information SBAR, Kardex, Intake/Output and MAR.

## 2017-10-23 NOTE — PROGRESS NOTES
Called in by RN concerned for [facility]. I was also notified of rapid response called ~2300 for r/o stroke. Hospitalist evaluated patient per RN. See notes. Arrived to see patient in room with family. Offered no complaints. /90, . Gen:  NAD  Bedside US showed distended uterus with complex fluid, mostly hypoechoic. 4.56 cm. Bladder decompressed. Pelvic:  Marsh catheter in place with ~35 mL concentrated urine. Patient given morphine for comfort. Bimanual exam performed expressing large clot followed by liquid dark blood. 3 of such amount noted this shift per RN. Total EBL 1200 mL. A/P  Postpartum hemorrhage s/p hemabate, cytotec, pitocin x3. Continue to observe closely. Patient made aware of possible continued hemorrhage requiring bakri balloon placement. Oliguric:  Pitocin DC'd. Continue LR. Acute blood loss anemia. Ferrous sulfate. Preeclampsia:  Mag sulfate on hold due to hypotension. Plan of care discussed. Patient expressed understanding and agreement.

## 2017-10-23 NOTE — ANESTHESIA POSTPROCEDURE EVALUATION
Post-Anesthesia Evaluation and Assessment    Patient: Anabel Hanna MRN: [id number]  SSN: [id number]    YOB: 1995  Age: 24 y.o. Sex: female       Cardiovascular Function/Vital Signs  Visit Vitals    /74 (BP 1 Location: Left arm, BP Patient Position: At rest)    Pulse 89    Temp 36.9 °C (98.4 °F)    Resp 18    Ht 5' 2\" (1.575 m)    Wt 124.7 kg (275 lb)    SpO2 (!) 89%    Breastfeeding Yes    BMI 50.3 kg/m2       Patient is status post spinal anesthesia for Procedure(s):   SECTION. Nausea/Vomiting: None    Postoperative hydration reviewed and adequate. Pain:  Pain Scale 1: Numeric (0 - 10) (10/23/17 0600)  Pain Intensity 1: 0 (10/23/17 06)   Managed    Neurological Status:   Neuro (WDL): Within Defined Limits (10/22/17 1459)   At baseline    Mental Status and Level of Consciousness: Alert and oriented     Pulmonary Status:   O2 Device: Room air (10/23/17 0600)   Adequate oxygenation and airway patent    Complications related to anesthesia: None    Post-anesthesia assessment completed.  No concerns    Signed By: Chase Su CRNA     2017

## 2017-10-23 NOTE — PROGRESS NOTES
Pt. diffulcult to arouse. B/P low. Large clot expressed with fundal check. [email or web address] is incontinent of stool. Pericare given. Called Dr. Fatoumata Ochoa Orders received to given 1000mcg Misoprostil and to turn off magnesium. And to get stat Magnesium level. Dr. Fatoumata Ochoa will update Dr. Michelle Colby on patient. 2000 Lab unable to draw Magnesium level. ER called to bring scanner  2045 Darius De Leon drawn and sent  2100 Pt has had 0 urine output since 1900. Hou not draining after flushing. Replaced hou. Still no urine output. Bleeding remains slow and steady. Dr. Michelle Colby notified. 2145 Pt reports she feels urge to void. Hou continues to not drain. Bladder scan shows 350ml of fluid. Called [facility] for bedside commode. Pt feels she might be able to void sitting up on toilet. Myself and another nurse attempted to get her up. Pt unable to sit straight up without falling backwards. Pt unable to follow simple commands. Pt returned to bed and Rapid response called. Dr. Michelle Colby notified. Normal findings from rapid response. Pt possibly vaso-vagaled with position change. 2330 Steady bleeding continues with occasional clots. Another hou placed in patient. 35cc urine obtained. Called Dr. Michelle Colby . MD coming in. 10mg Morphine given IV  0000 Dr. Michelle Colby ultrasound at bedside. Clots present. Evacuation of clots manually 1200cc by weight. [address] Pad without bleeding. 50cc urine output  0130 Pt resting with eyes closed. 0300 Pt r esting with eyes closed. No c/o pain or discomfort. Bleeding minimal  0600 Pt transfering to [address]. Urine output 200 cc. Dr. Michelle Colby aware. 0700 SBAR report given to MAGALI Montague RN.

## 2017-10-23 NOTE — PROGRESS NOTES
Progress Note    Patient: Mejia Juan MRN: [id number]  SSN: [id number]    YOB: 1995  Age: 24 y.o. Sex: female      Subjective:     Postpartum Day: 1     Delivery:  delivery    Pt denies complaints. Tolerating diet. Passing flatus. Pain controlled. No CP/SOB/dizziness. Denies HA, vision changes, epigastric pain.       Objective:    Patient Vitals for the past 24 hrs:   Temp Pulse Resp BP SpO2   10/23/17 0913 - (!) 127 - (!) 151/101 -   10/23/17 0600 98.4 °F (36.9 °C) 89 18 121/74 -   10/23/17 0131 - (!) 101 - 113/85 -   10/23/17 0101 - (!) 113 - 132/84 -   10/23/17 0031 - (!) 116 - 137/90 -   10/23/17 0030 - (!) 115 - (!) 131/95 -   10/23/17 0017 - (!) 103 - 124/86 -   10/23/17 0001 - (!) 130 - 102/65 -   10/22/17 2354 - (!) 120 - 113/54 -   10/22/17 2331 - (!) 126 - 108/55 -   10/22/17 2302 - (!) 122 - 122/69 -   10/22/17 2231 - (!) 140 - 119/84 -   10/22/17 2229 - - - - (!) 89 %   10/22/17 2225 - 97 - 114/68 -   10/22/17 2224 - - - - 96 %   10/22/17 2219 - - - - 97 %   10/22/17 2214 - - - - (!) 86 %   10/22/17 2213 - - - - 99 %   10/22/17 2210 - - - - 96 %   10/22/17 2205 - - - - 100 %   10/22/17 2200 98.4 °F (36.9 °C) - 16 - 99 %   10/22/17 2155 - - - - 100 %   10/22/17 2150 - - - - 100 %   10/22/17 2145 - - - - 100 %   10/22/17 2140 - - - - 98 %   10/22/17 2135 - - - - 98 %   10/22/17 2131 - (!) 108 - (!) 82/66 -   10/22/17 2130 - - - - 99 %   10/22/17 2125 - - - - 97 %   10/22/17 2120 - - - - 99 %   10/22/17 2115 - - - - 99 %   10/22/17 2110 - - - - 99 %   10/22/17 2105 - - - - 95 %   10/22/17 2103 - (!) 124 - (!) 89/75 -   10/22/17 2100 - - - - 98 %   10/22/17 2055 - - - - 94 %   10/22/17 2050 - - - - 98 %   10/22/17 2045 - - - - 94 %   10/22/17 2040 - - - - 95 %   10/22/17 2035 - - - - 98 %   10/22/17 2014 - - - - 98 %   10/22/17 2009 - - - - 98 %   10/22/17 2004 - - - - 100 %   10/22/17 2002 - (!) 180 - 108/64 -   10/22/17 1959 - - - - 100 %   10/22/17 1954 - - - - 99 % 10/22/17 1949 - - - - 100 %   10/22/17 1944 - - - - 99 %   10/22/17 1939 - - - - 99 %   10/22/17 1934 - - - - 98 %   10/22/17 1931 - (!) 103 - 96/71 -   10/22/17 1929 - - - - 98 %   10/22/17 1924 - - - - 99 %   10/22/17 1919 - - - - 100 %   10/22/17 1914 - - - - 100 %   10/22/17 1909 - - - - 100 %   10/22/17 1904 - - - - 100 %   10/22/17 1901 - (!) 104 - (!) 86/65 -   10/22/17 1900 98.1 °F (36.7 °C) - 16 - -   10/22/17 1859 - - - - 100 %   10/22/17 1854 - - - - 99 %   10/22/17 1849 - - - - 100 %   10/22/17 1844 - - - - 100 %   10/22/17 1839 - - - - 99 %   10/22/17 1838 - (!) 107 - (!) 80/53 -   10/22/17 1834 - - - - 100 %   10/22/17 1829 - - - - 99 %   10/22/17 1824 - - - - 99 %   10/22/17 1819 - - - - 99 %   10/22/17 1814 - - - - 98 %   10/22/17 1809 - - - - 99 %   10/22/17 1804 - - - - 99 %   10/22/17 1801 - (!) 107 - 108/70 -   10/22/17 1759 - - - - 97 %   10/22/17 1754 - - - - 98 %   10/22/17 1749 - - - - 98 %   10/22/17 1744 - - - - 98 %   10/22/17 1739 - - - - 97 %   10/22/17 1734 - - - - 100 %   10/22/17 1732 - 98 - 103/51 -   10/22/17 1729 - - - - 99 %   10/22/17 1724 - - - - 98 %   10/22/17 1719 - - - - 99 %   10/22/17 1714 - - - - 99 %   10/22/17 1709 - - - - 98 %   10/22/17 1704 - - - - 98 %   10/22/17 1701 - 92 - 103/56 -   10/22/17 1659 - - - - 98 %   10/22/17 1654 - - - - 98 %   10/22/17 1649 - - - - 98 %   10/22/17 1644 - - - - 99 %   10/22/17 1640 - 96 - 109/69 -   10/22/17 1639 - - - - 98 %   10/22/17 1624 - 96 21 - 100 %   10/22/17 1622 - 95 9 - 100 %   10/22/17 1615 - 96 (!) 0 125/81 100 %   10/22/17 1610 - 95 11 117/86 98 %   10/22/17 1600 - 98 24 126/89 96 %   10/22/17 1550 - 99 21 115/77 99 %   10/22/17 1540 - 98 20 123/88 99 %   10/22/17 1530 - 98 24 132/86 97 %   10/22/17 1520 - (!) 103 22 124/90 96 %   10/22/17 1510 - (!) 108 23 130/78 94 %   10/22/17 1509 - (!) 107 - 151/89 -   10/22/17 1500 - (!) 108 23 151/89 94 %   10/22/17 1455 - (!) 113 18 146/90 94 %   10/22/17 1452 - (!) 115 21 (!) 144/105 98 %   10/22/17 1450 - (!) 107 23 (!) 127/94 92 %   10/22/17 1448 97.5 °F (36.4 °C) - - - -   10/22/17 1447 - - - 127/88 92 %   10/22/17 1440 - (!) 109 12 140/75 95 %   10/22/17 1231 - 90 - (!) 156/103 -   10/22/17 1157 - 97 - (!) 166/95 -   10/22/17 1124 - 90 - (!) 152/97 -   10/22/17 0938 - 81 - 143/80 -       Date 10/22/17 07 - 10/23/17 0659 10/23/17 07 - 10/24/17 0659   Shift 3226-0216 3023-1695 24 Hour Total 9255-8133 2225-1592 24 Hour Total   I  N  T  A  K  E   I.V.  (mL/kg/hr) 1500  (1)  1500  (0.5)         Volume (lactated Ringers infusion) 700  700         PitOCIN Volume (oxytocin (PITOCIN) 20 units/1000 ml LR) 800  800       Shift Total  (mL/kg) 1500  (12)  1500  (12)      O  U  T  P  U  T   Urine  (mL/kg/hr) 900  (0.6) 200  (0.1) 1100  (0.4)         Urine Output 500  500         Urine Output (mL) ([REMOVED] Urinary Catheter 10/22/17 Marsh) 400 200 600       Blood 800  800         Estimated Blood Loss 800  800       Shift Total  (mL/kg) 1700  (13.6) 200  (1.6) 1900  (15.2)      NET -200 -200 -400      Weight (kg) 124.7 124.7 124.7 124.7 124.7 124.7         Gen: NAD   CV: RRR   CHEST: Normal effort without use of accessory muscles. CTAB   Abdomen: Normoactive BS, soft, NT   Uterine Fundus:   firm at umbilicus, nontender. Perineum clean and dry. Marsh in place. Incision:  Clean/Dry/Intact   Extremities: +1 LE edema     Lab/Data Review:  CBC:   Lab Results   Component Value Date/Time    WBC 23.0 (H) 10/23/2017 06:19 AM    HGB 6.8 (L) 10/23/2017 06:19 AM    HCT 19.9 (L) 10/23/2017 06:19 AM     10/23/2017 06:19 AM       Assessment:     Doing well postpartum  delivery   Preeclampsia:  BPs ranged hypotension to normal.  Mag sulfate DC'd due to hypotension. BP increased with pain response. Continue to follow closely. Oliguria:  Improved. Continue supportive care. Leukocytosis, reactive. Acute blood loss anemia:  Ferrous sulfate.   Discussed possibility of transfusion with hemodynamic instability. Continue to observe for now. Plan:   Continue routine postpartum care. Postpartum care discussed including diet, ambulation, and actvitiy restrictions. Discharge instructions and questions answered for  delivery.     Signed By: Zaida Palomo DO     2017

## 2017-10-23 NOTE — ROUTINE PROCESS
TRANSFER - IN REPORT:    Verbal report received from NIGEL Akhtar RN(name) on Tristin Meyers  being received from L[facility]D Recovery(unit) for routine progression of care      Report consisted of patients Situation, Background, Assessment and   Recommendations(SBAR). Information from the following report(s) SBAR, Kardex, Intake/Output and MAR was reviewed with the receiving nurse. Opportunity for questions and clarification was provided. Assessment completed upon patients arrival to unit and care assumed. 0750--assessment done--johanna care done--denies nausea--reports passing flatus--to begin regular diet this AM--SCDs in place--pain well managed at present--po fluids encouraged--family at the bedside--twin B-boy is at the bedside--patient wants to breast feed--will assist to start today. 1000--Twin A--girt-- brought to the bedside--plans for feeding for both babies have been established with the help of the Lactation Consultant-patient verbalizes understanding--has family that are helping her. 1200--incision is is in a fold of patients abdomen--sterile gauze placed to absorb moisture--ice pack over incision for comfort--spoke with patient concerning her Mother's request to be able to call and receive health information on her--patient gave a code number of 0221 that she will give to her mother, Zaira Thompson, and to her mother-in-law, Antonia Barry are to speak only to the nurse taking care of the patient and give this code number before information will be given. 1345--instructed on use of Incentive Spirometer--able to elevate to 1500  1645--discussed pain management--to begin oral medication--discussed options--declines for the present--Marsh cath removed--johanna care done--assisted out of bed--ambulated a short distance--denied dizziness, but legs became weak--assisted to sit in a chair until bed linens changed--assisted back to bed and repositioned--SCDs resumed.   1510--vomited an unmeasured of watery liquid-states it happened suddenly--denies being nauseated--patient cleaned up by her family--advised to call for assistance when ready to get up to the bathroom to void for the first time--verbalizes understanding.   1850--assisted out of bed to the bathroom--moved slowly, but tolerated--voided 350cc--johanna care reviewed--passed several small clots--lochia moderate--assisted back to bed--SCDs to remain off--advised not to get up without assistance--verbalizes understanding

## 2017-10-23 NOTE — LACTATION NOTE
Mother had twins. The girl is in SCN and has only been given formula. The baby boy is in the room with mom but has also only been given formula. Mother would eventually like to exclusively breastfeed. Set mom up with a pump and demonstrated use. Helped position baby boy in cross cradle on right. Baby opened his mouth wide but fell asleep. Dribbled formula but he did not wake. It was time for him to eat, so grandmother then supplemented. Encouraged lots of skin to skin and continue to try to nurse. Dr. Nilesh Johansen would like the baby girl, who is smaller, to get any pumped colostrum before being supplemented. Baby may be able to come to the room later today. Encouraged skin to skin with her as well. Mother will do a combination of attempting to nurse, pumping and supplementing until her milk comes in. Then she will exclusively breastfeed. Offered assistance if needed.

## 2017-10-24 LAB
ALBUMIN SERPL-MCNC: 1.9 G/DL (ref 3.4–5)
ALBUMIN/GLOB SERPL: 0.6 {RATIO} (ref 0.8–1.7)
ALP SERPL-CCNC: 86 U/L (ref 45–117)
ALT SERPL-CCNC: 22 U/L (ref 13–56)
ANION GAP SERPL CALC-SCNC: 10 MMOL/L (ref 3–18)
AST SERPL-CCNC: 68 U/L (ref 15–37)
BASOPHILS # BLD: 0 K/UL (ref 0–0.06)
BASOPHILS NFR BLD: 0 % (ref 0–2)
BILIRUB SERPL-MCNC: 0.3 MG/DL (ref 0.2–1)
BUN SERPL-MCNC: 9 MG/DL (ref 7–18)
BUN/CREAT SERPL: 14 (ref 12–20)
CALCIUM SERPL-MCNC: 8.2 MG/DL (ref 8.5–10.1)
CHLORIDE SERPL-SCNC: 107 MMOL/L (ref 100–108)
CO2 SERPL-SCNC: 25 MMOL/L (ref 21–32)
CREAT SERPL-MCNC: 0.66 MG/DL (ref 0.6–1.3)
DIFFERENTIAL METHOD BLD: ABNORMAL
EOSINOPHIL # BLD: 0.1 K/UL (ref 0–0.4)
EOSINOPHIL NFR BLD: 1 % (ref 0–5)
ERYTHROCYTE [DISTWIDTH] IN BLOOD BY AUTOMATED COUNT: 15.6 % (ref 11.6–14.5)
GLOBULIN SER CALC-MCNC: 3.1 G/DL (ref 2–4)
GLUCOSE SERPL-MCNC: 83 MG/DL (ref 74–99)
HCT VFR BLD AUTO: 15.4 % (ref 35–45)
HCT VFR BLD AUTO: 19.1 % (ref 35–45)
HGB BLD-MCNC: 5.2 G/DL (ref 12–16)
HGB BLD-MCNC: 6.6 G/DL (ref 12–16)
LYMPHOCYTES # BLD: 2.1 K/UL (ref 0.9–3.6)
LYMPHOCYTES NFR BLD: 14 % (ref 21–52)
MCH RBC QN AUTO: 26.1 PG (ref 24–34)
MCHC RBC AUTO-ENTMCNC: 33.8 G/DL (ref 31–37)
MCV RBC AUTO: 77.4 FL (ref 74–97)
MONOCYTES # BLD: 1.7 K/UL (ref 0.05–1.2)
MONOCYTES NFR BLD: 11 % (ref 3–10)
NEUTS SEG # BLD: 11.5 K/UL (ref 1.8–8)
NEUTS SEG NFR BLD: 74 % (ref 40–73)
PLATELET # BLD AUTO: 215 K/UL (ref 135–420)
PMV BLD AUTO: 10.4 FL (ref 9.2–11.8)
POTASSIUM SERPL-SCNC: 4 MMOL/L (ref 3.5–5.5)
PROT SERPL-MCNC: 5 G/DL (ref 6.4–8.2)
RBC # BLD AUTO: 1.99 M/UL (ref 4.2–5.3)
SODIUM SERPL-SCNC: 142 MMOL/L (ref 136–145)
WBC # BLD AUTO: 15.4 K/UL (ref 4.6–13.2)

## 2017-10-24 PROCEDURE — 65270000029 HC RM PRIVATE

## 2017-10-24 PROCEDURE — 36415 COLL VENOUS BLD VENIPUNCTURE: CPT | Performed by: OBSTETRICS & GYNECOLOGY

## 2017-10-24 PROCEDURE — 36430 TRANSFUSION BLD/BLD COMPNT: CPT

## 2017-10-24 PROCEDURE — 77030020256 HC SOL INJ NACL 0.9%  500ML

## 2017-10-24 PROCEDURE — 74011250636 HC RX REV CODE- 250/636: Performed by: OBSTETRICS & GYNECOLOGY

## 2017-10-24 PROCEDURE — 74011250637 HC RX REV CODE- 250/637: Performed by: OBSTETRICS & GYNECOLOGY

## 2017-10-24 PROCEDURE — P9016 RBC LEUKOCYTES REDUCED: HCPCS | Performed by: OBSTETRICS & GYNECOLOGY

## 2017-10-24 PROCEDURE — 85018 HEMOGLOBIN: CPT | Performed by: OBSTETRICS & GYNECOLOGY

## 2017-10-24 PROCEDURE — 80053 COMPREHEN METABOLIC PANEL: CPT | Performed by: OBSTETRICS & GYNECOLOGY

## 2017-10-24 PROCEDURE — 30233N1 TRANSFUSION OF NONAUTOLOGOUS RED BLOOD CELLS INTO PERIPHERAL VEIN, PERCUTANEOUS APPROACH: ICD-10-PCS | Performed by: OBSTETRICS & GYNECOLOGY

## 2017-10-24 PROCEDURE — 85014 HEMATOCRIT: CPT | Performed by: OBSTETRICS & GYNECOLOGY

## 2017-10-24 PROCEDURE — 85025 COMPLETE CBC W/AUTO DIFF WBC: CPT | Performed by: OBSTETRICS & GYNECOLOGY

## 2017-10-24 RX ORDER — SODIUM CHLORIDE 9 MG/ML
250 INJECTION, SOLUTION INTRAVENOUS AS NEEDED
Status: DISCONTINUED | OUTPATIENT
Start: 2017-10-24 | End: 2017-10-27 | Stop reason: HOSPADM

## 2017-10-24 RX ORDER — ACETAMINOPHEN 325 MG/1
650 TABLET ORAL
Status: DISCONTINUED | OUTPATIENT
Start: 2017-10-24 | End: 2017-10-27 | Stop reason: HOSPADM

## 2017-10-24 RX ORDER — DIPHENHYDRAMINE HYDROCHLORIDE 50 MG/ML
25 INJECTION, SOLUTION INTRAMUSCULAR; INTRAVENOUS ONCE
Status: COMPLETED | OUTPATIENT
Start: 2017-10-24 | End: 2017-10-24

## 2017-10-24 RX ADMIN — IBUPROFEN 800 MG: 400 TABLET, FILM COATED ORAL at 08:41

## 2017-10-24 RX ADMIN — LABETALOL HYDROCHLORIDE 200 MG: 100 TABLET, FILM COATED ORAL at 08:38

## 2017-10-24 RX ADMIN — ACETAMINOPHEN 650 MG: 325 TABLET, FILM COATED ORAL at 10:00

## 2017-10-24 RX ADMIN — DIPHENHYDRAMINE HYDROCHLORIDE 25 MG: 50 INJECTION, SOLUTION INTRAMUSCULAR; INTRAVENOUS at 10:05

## 2017-10-24 RX ADMIN — IBUPROFEN 800 MG: 400 TABLET, FILM COATED ORAL at 17:30

## 2017-10-24 RX ADMIN — LABETALOL HYDROCHLORIDE 200 MG: 100 TABLET, FILM COATED ORAL at 17:29

## 2017-10-24 RX ADMIN — FERROUS SULFATE TAB 325 MG (65 MG ELEMENTAL FE) 325 MG: 325 (65 FE) TAB at 17:29

## 2017-10-24 RX ADMIN — FERROUS SULFATE TAB 325 MG (65 MG ELEMENTAL FE) 325 MG: 325 (65 FE) TAB at 08:38

## 2017-10-24 NOTE — PROGRESS NOTES
Progress Note    Patient: Concepción Berumen MRN: [id number]  SSN: [id number]    YOB: 1995  Age: 24 y.o. Sex: female      Subjective:     Postpartum Day: 2     Delivery:  delivery for breech twins with pre-eclampsia and postpartum hemorrhage. Pt denies complaints. She has no headache or uncontrolled pain. She is ambulatory and tolerating a full diet. B/P is elevated, but Labetalol just started this am.    Pt. Notes being fatigue, but denies any other symptoms of anemia. She is tachycardic. Objective:      Patient Vitals for the past 8 hrs:   BP Temp Pulse Resp SpO2   10/24/17 0441 (!) 150/98 98.2 °F (36.8 °C) (!) 128 18 100 %     Insert Hgb Here    CV: Tachy, RR       CHEST: CTAB       Uterine Fundus:   firm       Incision:  no significant drainage, no dehiscence, no significant erythema         Lab/Data Review:  CBC:   Lab Results   Component Value Date/Time    WBC 15.4 (H) 10/24/2017 06:20 AM    HGB 5.2 (LL) 10/24/2017 06:20 AM    HCT 15.4 (LL) 10/24/2017 06:20 AM     10/24/2017 06:20 AM       Date 10/23/17 0700 - 10/24/17 0659 10/24/17 0700 - 10/25/17 0659   Shift 5037-9411 1327-0905 24 Hour Total 1768-0802 4514-7964 24 Hour Total   I  N  T  A  K  E   P.O. 1410  1410         P. O. 1410  1410       I.V.  (mL/kg/hr) 1500  (1)  1500  (0.5)         I.V. 1500  1500       Shift Total  (mL/kg) 2910  (23.3)  2910  (23.3)      O  U  T  P  U  T   Urine  (mL/kg/hr) 1350  (0.9)  1350  (0.5)         Urine Voided 350  350         Urine Occurrence(s)  2 x 2 x         Urine Output (mL) ([REMOVED] Urinary Catheter 10/22/17 [facility]) 1000  1000       Emesis/NG output            Emesis Occurrence(s) 1 x  1 x       Shift Total  (mL/kg) 1350  (10.8)  1350  (10.8)      NET 1560  1560      Weight (kg) 124.7 124.7 124.7 124.7 124.7 124.7         Assessment:     Status post: Doing well postpartum  delivery   Anemia-asymptomatic but tachycardic and fatigued. B/P: elevated.   Plan:   Transfuse 2 units PRBCs with pre-medication. Will re-start labetalol. Postpartum care discussed including diet, ambulation, and actvitiy restrictions. Discharge instructions and questions answered for  delivery.     Signed By: Yaneth Bañuelos MD     2017

## 2017-10-24 NOTE — PROGRESS NOTES
~~ 0730 ASSUME CARE OF PT SLEEPING IN BED, SR UP X2, CB IN REACH, BABIES SLEEPING IN THEIR CRIBS, FOB ON FOLD-OUT--  BABY A FUSSY--   BABY RE-SWADDLED & GIVEN PACIFIER. BABY QUIETS--   PT NOT DISTURBED.    ~~0800 REG DIET BREAKFAST TRAY SERVED    ~~0830 PT NOW AWAKE SITTING UP IN BED- PT SAYS SHE SLEPT. PT APPEARS COMFORTABLE-- PT RATES PAIN 6/10 & WANTS MED. PT DENIES NAUSEA, INCREASED BLEEDING OR DIFF VOIDING- PT WET THE BED SEVERAL TIMES LAST NIGHT-- SUGGESTED GETTING UP OOB TO BR Q2 HRS BEFORE \"IT HITS SUDDENLY\" (HER EXCUSE). PT UNDERSTANDS & AGREES. PT SAYS SHE HAS MILD ITCHING ON ABD-- DECLINES ITCH MED- ASSESSMENT AS CHARTED--      ~~0310 MOTRIN GIVEN FOR CRAMPING & INCISIONAL PAIN. PT NO LONGER USING ABD ICE PACK.     ~~0900 BABY A TAKEN TO THE [facility] BECAUSE SHE IS COLD--    ~~0915 DR ALY IN TO SEE & ASSESS PT--   POC REVIEWED-- WILL DO BLOOD TRANSFUSION DUE TO DECR H&H--   MD HAS PT SIGN CONSENT. BABY B TO THE NSY  BECAUSE HE IS COLS--    ~~0935 PAIN MED EFFECTIVE. PT UP TO BR TO VOID QS W/O DIFF    ~~ 1000 PT PRE-MEDICATED W/ TYLENOL. HL FLUSHED W/ 5CC NS- PATENT W/O SIGN OF INFILTRATION. 500CC NS UP IN PREP FOR TRANSFUSION- INFUSING @ 50CC/HR VIA PUMP    ~~1005 PT PRE-MEDICATED W/ BENADRYL ATER REVIEW OF S/E--    ~~1019 PRE-TRANSFUSION VS    ~~1027 BLOOD INFUSING VIA PUMP--  BLOOD ARRIVING @ IV SITE--   VS CHECKED    ~~1042 ROOM LOUD W/ VISITORS--  PTS BP ELEVATING-- REVIEWED NEED FOR QUIET-  SOME VISITORS OUT, MOM & SISTER REMAIN. PT REPOS TO R SIDE, PROPPED FOR COMFORT.      ~~1100 BPS MORE WNL WHEN SIDE-LYING. SHORTLY AFTER REPOS TO SIDE PT FALLS ASLEEP/ SNORING. FOB HAS BEEN SLEEPING ON FOLD-OUT --    ~~1130 PT SLEEPING ON & OFF- NO S/E FR BLOOD NOTED    ~~1200 REG DIET LUNCH TRAY LEFT, PT STILL DOZING. FOB SLEEPING.    ~~1234 VS PRIOR TO 2nd UNIT TO BE INFUSED. BOTH BABIES RETURNED FR THE NSY AFTER WARMING. FOB SLEEPING.  MOM & SISTER HOLDING BABY-- INSTRUCTIONS GIVEN TO KEEP BABY WRAPPED & WARM- ALL VOICE UNDERSTANDING.     ~~1240 PT REPOS TO SITTING & RISE IN BP NOTED. PT TO EAT-- NO C/O VOICED    ~~1300 PT DENIES ANY C/O AS BLOOD INFUSES. ONLY PICKING @ FOOD    ~~1330 PT UP TO BR TO VOID QS & HAVE LOOSE BM-  PAD CHANGED.  FOB ASSISTING-    ~~1345 PEDS IN TO TALK TO PT & FOB ABOUT CHANGING BABIES FORMULA TO INCREASE CALORIES--     ~~1400 AFTER DOING TEMP CHECKS ON TWINS PT OBSERVED BRIEFLY HOLDING EACH BABY- PT HAS HAD MINIMAL BONDING W/ TWINS SO FAR TODAY-     ~~1445 2nd UNIT OF BLOOD COMPLETE--  TUBING REMOVED FR HL-- BOTH HL PORTS FLUSHED W/ 5CC NS-  PT REPOS TO R SIDE-- BP BETTER SIDE LYING- PT ENCOURAGED TO REST AS MUCH ON SIDE AS SHE CAN COMFORTABLY--

## 2017-10-24 NOTE — LACTATION NOTE
Mother states she has not gotten anything at all when she pumps. She states she has put both babies to the breast but neither has nursed. Suggested skin to skin and have Dad dribble formula on the nipple while the babies are at the breast to encourage sucking. Also encouraged pumping to stimulate the breasts for her milk to come in. General discussion. Offered assistance if needed.

## 2017-10-24 NOTE — ROUTINE PROCESS
Bedside and Verbal shift change report given to [address] [place] [address] (oncoming nurse) by Swapna Mosher RN (offgoing nurse). Report included the following information SBAR, OR Summary, Procedure Summary, Intake/Output, MAR and Recent Results.

## 2017-10-25 LAB
ABO + RH BLD: NORMAL
BLD PROD TYP BPU: NORMAL
BLD PROD TYP BPU: NORMAL
BLOOD GROUP ANTIBODIES SERPL: NORMAL
BPU ID: NORMAL
BPU ID: NORMAL
CALLED TO:,BCALL1: NORMAL
CROSSMATCH RESULT,%XM: NORMAL
CROSSMATCH RESULT,%XM: NORMAL
HCT VFR BLD AUTO: 22.2 % (ref 35–45)
HGB BLD-MCNC: 7.4 G/DL (ref 12–16)
SPECIMEN EXP DATE BLD: NORMAL
STATUS OF UNIT,%ST: NORMAL
STATUS OF UNIT,%ST: NORMAL
UNIT DIVISION, %UDIV: 0
UNIT DIVISION, %UDIV: 0

## 2017-10-25 PROCEDURE — 74011250637 HC RX REV CODE- 250/637: Performed by: OBSTETRICS & GYNECOLOGY

## 2017-10-25 PROCEDURE — 85014 HEMATOCRIT: CPT | Performed by: OBSTETRICS & GYNECOLOGY

## 2017-10-25 PROCEDURE — 85018 HEMOGLOBIN: CPT | Performed by: OBSTETRICS & GYNECOLOGY

## 2017-10-25 PROCEDURE — 36415 COLL VENOUS BLD VENIPUNCTURE: CPT | Performed by: OBSTETRICS & GYNECOLOGY

## 2017-10-25 PROCEDURE — 65270000029 HC RM PRIVATE

## 2017-10-25 PROCEDURE — 74011250636 HC RX REV CODE- 250/636: Performed by: OBSTETRICS & GYNECOLOGY

## 2017-10-25 RX ORDER — LABETALOL 100 MG/1
300 TABLET, FILM COATED ORAL EVERY 12 HOURS
Status: DISCONTINUED | OUTPATIENT
Start: 2017-10-25 | End: 2017-10-26

## 2017-10-25 RX ADMIN — FERROUS SULFATE TAB 325 MG (65 MG ELEMENTAL FE) 325 MG: 325 (65 FE) TAB at 09:04

## 2017-10-25 RX ADMIN — IBUPROFEN 800 MG: 400 TABLET, FILM COATED ORAL at 18:26

## 2017-10-25 RX ADMIN — LABETALOL HYDROCHLORIDE 300 MG: 100 TABLET, FILM COATED ORAL at 09:04

## 2017-10-25 RX ADMIN — LABETALOL HYDROCHLORIDE 300 MG: 100 TABLET, FILM COATED ORAL at 21:44

## 2017-10-25 RX ADMIN — HYDRALAZINE HYDROCHLORIDE 10 MG: 20 INJECTION INTRAMUSCULAR; INTRAVENOUS at 14:38

## 2017-10-25 RX ADMIN — ACETAMINOPHEN 650 MG: 325 TABLET, FILM COATED ORAL at 21:57

## 2017-10-25 RX ADMIN — FERROUS SULFATE TAB 325 MG (65 MG ELEMENTAL FE) 325 MG: 325 (65 FE) TAB at 18:27

## 2017-10-25 NOTE — PROGRESS NOTES
Baby announcement.     [address] [facility]   Staff [address] [facility]   [phone]

## 2017-10-25 NOTE — PROGRESS NOTES
Progress Note    Patient: Adryan Henson MRN: [id number]  SSN: [id number]    YOB: 1995  Age: 24 y.o. Sex: female      Subjective:     PostOp Day: 3     Delivery:  delivery    The patient feels well. The patient denies emotional concerns, dizziness with ambulation, or lightheadedness. Pain is  well controlled with current medications. The babies arewell. Baby is feeding via breast. Urinary output is adequate. Voiding spontaneous. The patient is ambulating well. The patient is tolerating a normal diet. Objective:      Patient Vitals for the past 8 hrs:   BP Temp Pulse Resp SpO2   10/25/17 0830 (!) 161/112 98.5 °F (36.9 °C) (!) 116 16 96 %   10/25/17 0512 (!) 147/95 - (!) 111 - -   10/25/17 0445 (!) 155/97 98.1 °F (36.7 °C) (!) 109 18 100 %     General:    alert, cooperative, no distress   Lochia:  appropriate   Uterine Fundus:   firm   Fundus Location:  0   Incision:  no significant drainage, no dehiscence, no significant erythema   DVT Evaluation:  No evidence of DVT seen on physical exam.     Lab/Data Review:  No results found for this or any previous visit (from the past 12 hour(s)). Recent Results (from the past 24 hour(s))   HEMATOCRIT    Collection Time: 10/24/17  4:50 PM   Result Value Ref Range    HCT 19.1 (L) 35.0 - 45.0 %   HEMOGLOBIN    Collection Time: 10/24/17  4:50 PM   Result Value Ref Range    HGB 6.6 (L) 12.0 - 16.0 g/dL           Assessment:     Status post: Postpartum  delivery complicated by severe preeclamsia and postpartum hemorrhage. 2 units PRBCs received yesterday, patient asymptomatic at this time. Elevated BP continues. Plan:     Postpartum care discussed including diet, ambulation, and actvitiy restrictions. Discharge instructions and questions answered for  delivery. Labetalol increased to 300 mg BID. Will monitor BP.     Signed By: Jillian Castorena,      2017

## 2017-10-25 NOTE — ROUTINE PROCESS
Bedside shift change report given to nestor lazaro rn (oncoming nurse) by Shreya Fish rn (offgoing nurse). Report included the following information Kardex, Procedure Summary, Intake/Output, MAR and Recent Results.

## 2017-10-25 NOTE — PROGRESS NOTES
Dr. Nilda Vail notified of patient's most recent H/H of 7.4 and 22.2 on 10/25/17 at [phone]. Per her, as long as patient continues to be asymptomatic of previous symptoms, we will continue to monitor.

## 2017-10-25 NOTE — ROUTINE PROCESS
Noted elevated BP of 155/97. Recheck resulted at 147/95. Per MD order, notify physician for systolic ZB>204, or [id number]>641. Patient is alert, oriented, and asymptomatic. Following vitals closely, and will continue to monitor. Will pass off to day shift to follow and notify MD as needed.

## 2017-10-25 NOTE — ROUTINE PROCESS
Bedside and Verbal shift change report given to [address]  (oncoming nurse) by Madison Shen RN (offgoing nurse). Report included the following information SBAR, Kardex, Intake/Output and Recent Results.

## 2017-10-25 NOTE — ROUTINE PROCESS
Mother doing well. Up without complaints. Voiding without problems. Pain managed well without medication. Bonding with babies, family and friends present the whole shift feeding babies. Blood pressure continues to be elevated.

## 2017-10-25 NOTE — PROGRESS NOTES
Problem: Falls - Risk of  Goal: *Absence of Falls  Document Jluis Fall Risk and appropriate interventions in the flowsheet.    Outcome: Progressing Towards Goal  Fall Risk Interventions:

## 2017-10-25 NOTE — ROUTINE PROCESS
1438 blood pressure 157/113, hr 117. Called provider to inform of blood pressure, gave apresoline per [facility] protocol order.

## 2017-10-25 NOTE — ROUTINE PROCESS
Mother doing well, up without complaints, voiding without problems. Did not require pain medication overnight. Bonding well with babies. Blood pressure remains elevated. Monitoring closely, and will pass on to day shift.  (See previous note for details)

## 2017-10-25 NOTE — ROUTINE PROCESS
Bedside and Verbal shift change report given to Willean Shone RN (oncoming nurse) by Yeimi Santoyo RN (offgoing nurse). Report included the following information SBAR, Procedure Summary, Intake/Output, MAR and Recent Results.

## 2017-10-26 PROBLEM — O14.10 SEVERE PREECLAMPSIA: Status: ACTIVE | Noted: 2017-10-26

## 2017-10-26 PROBLEM — O11.9 CHRONIC HYPERTENSION WITH SUPERIMPOSED PREECLAMPSIA: Status: ACTIVE | Noted: 2017-10-26

## 2017-10-26 PROCEDURE — 74011250637 HC RX REV CODE- 250/637: Performed by: OBSTETRICS & GYNECOLOGY

## 2017-10-26 PROCEDURE — 65270000029 HC RM PRIVATE

## 2017-10-26 PROCEDURE — 77030036554

## 2017-10-26 RX ORDER — LABETALOL 100 MG/1
400 TABLET, FILM COATED ORAL EVERY 12 HOURS
Status: DISCONTINUED | OUTPATIENT
Start: 2017-10-26 | End: 2017-10-27 | Stop reason: HOSPADM

## 2017-10-26 RX ORDER — FUROSEMIDE 20 MG/1
40 TABLET ORAL ONCE
Status: COMPLETED | OUTPATIENT
Start: 2017-10-26 | End: 2017-10-26

## 2017-10-26 RX ADMIN — LABETALOL HYDROCHLORIDE 400 MG: 100 TABLET, FILM COATED ORAL at 09:20

## 2017-10-26 RX ADMIN — OXYCODONE HYDROCHLORIDE AND ACETAMINOPHEN 1 TABLET: 5; 325 TABLET ORAL at 04:37

## 2017-10-26 RX ADMIN — IBUPROFEN 800 MG: 400 TABLET, FILM COATED ORAL at 03:02

## 2017-10-26 RX ADMIN — FERROUS SULFATE TAB 325 MG (65 MG ELEMENTAL FE) 325 MG: 325 (65 FE) TAB at 09:19

## 2017-10-26 RX ADMIN — FUROSEMIDE 40 MG: 20 TABLET ORAL at 09:19

## 2017-10-26 RX ADMIN — FERROUS SULFATE TAB 325 MG (65 MG ELEMENTAL FE) 325 MG: 325 (65 FE) TAB at 18:31

## 2017-10-26 RX ADMIN — OXYCODONE HYDROCHLORIDE AND ACETAMINOPHEN 1 TABLET: 5; 325 TABLET ORAL at 09:26

## 2017-10-26 RX ADMIN — OXYCODONE HYDROCHLORIDE AND ACETAMINOPHEN 1 TABLET: 5; 325 TABLET ORAL at 14:07

## 2017-10-26 RX ADMIN — LABETALOL HYDROCHLORIDE 400 MG: 100 TABLET, FILM COATED ORAL at 21:13

## 2017-10-26 NOTE — PROGRESS NOTES
When attempting to flush pt IV to assess patentcy, IV flush not able to be completed and patient complained of pain. IV removed. Patient aware that new IV will need to be inserted if she in need of medicactions or transfusion.

## 2017-10-26 NOTE — PROGRESS NOTES
Problem: Falls - Risk of  Goal: *Absence of Falls  Document Jluis Fall Risk and appropriate interventions in the flowsheet.    Fall Risk Interventions:

## 2017-10-26 NOTE — ROUTINE PROCESS
Bedside shift change report given to Shira Dumont RN (oncoming nurse) by Heather Lowe RN (offgoing nurse). Report included the following information SBAR, Kardex, Procedure Summary, Intake/Output, MAR and Recent Results.

## 2017-10-26 NOTE — PROGRESS NOTES
Progress Note    Patient: Amanda Piña MRN: [id number]  SSN: [id number]    YOB: 1995  Age: 24 y.o. Sex: female      Subjective:     Postpartum Day: 4    Delivery:  delivery    Pt denies complaints. Tolerating diet. +BM. Pain controlled. No CP/SOB/dizziness. Objective:    Patient Vitals for the past 24 hrs:   Temp Pulse Resp BP SpO2   10/26/17 0500 98.6 °F (37 °C) (!) 104 16 (!) 151/99 -   10/26/17 0110 97.6 °F (36.4 °C) (!) 105 16 (!) 147/93 99 %   10/25/17 2115 98.3 °F (36.8 °C) (!) 121 18 (!) 155/92 100 %   10/25/17 1608 - (!) 120 - (!) 154/96 -   10/25/17 1506 - (!) 120 - (!) 157/107 -   10/25/17 1438 98 °F (36.7 °C) (!) 117 16 (!) 157/113 96 %   10/25/17 1031 - (!) 111 - (!) 158/102 -   10/25/17 0830 98.5 °F (36.9 °C) (!) 116 16 (!) 161/112 96 %       Gen: NAD   CV: RRR   CHEST: Normal effort without use of accessory muscles. CTAB   Abdomen: Normoactive BS, soft, NT   Uterine Fundus:   firm at umbilicus, nontender. Incision:  Clean/Dry/Intact   Extremities: 2+ LE edema     Lab/Data Review:  CBC:   Lab Results   Component Value Date/Time    HGB 7.4 (L) 10/25/2017 06:07 PM    HCT 22.2 (L) 10/25/2017 06:07 PM       Assessment:      Breech delivery O32. 1XX0    Twins Z38.5     labor in third trimester O60.03    Postpartum hemorrhage, delivered, current hospitalization O72.1    Anemia of mother in pregnancy, delivered with postpartum condition O99.03    Chronic hypertension with superimposed preeclampsia O11.9   HTN poorly controlled. Labetalol dose increased. Lasix once. Acute blood loss anemia s/p 2PRBC. Plan:   Continued inpatient care indicated for BP control. Postpartum care discussed including diet, ambulation, and actvitiy restrictions. Discharge instructions and questions answered for  delivery.     Signed By: Key Officer, DO     2017

## 2017-10-26 NOTE — ROUTINE PROCESS
Verbal shift change report given to Darion Johnson RN (oncoming nurse) by UYEN Townsend RN (offgoing nurse). Report included the following information Kardex, Intake/Output, MAR and Recent Results. 0918--assessment done--voiding without difficulty--to be started on Lasix this AM--denies weakness when up--continues to have frontal headache discomfort which Motrin does not touch, she reports--will medicate with 1 Percocet, which she states does help--continues pumping her breasts, but is not getting much--breasts remain soft--bottle feeding--passing flatus and tolerating diet, but has not had BM--Labetalol was increased to 400 mg Bid--po fluids encouraged. 1830--B/P remains elevated--effective control of headache discomfort with Percocet 1 tab--continues to pump.

## 2017-10-27 VITALS
BODY MASS INDEX: 50.61 KG/M2 | TEMPERATURE: 98.1 F | HEIGHT: 62 IN | HEART RATE: 102 BPM | OXYGEN SATURATION: 99 % | RESPIRATION RATE: 19 BRPM | SYSTOLIC BLOOD PRESSURE: 117 MMHG | WEIGHT: 275 LBS | DIASTOLIC BLOOD PRESSURE: 74 MMHG

## 2017-10-27 PROCEDURE — 74011250637 HC RX REV CODE- 250/637: Performed by: OBSTETRICS & GYNECOLOGY

## 2017-10-27 PROCEDURE — 3E0234Z INTRODUCTION OF SERUM, TOXOID AND VACCINE INTO MUSCLE, PERCUTANEOUS APPROACH: ICD-10-PCS | Performed by: OBSTETRICS & GYNECOLOGY

## 2017-10-27 PROCEDURE — 90686 IIV4 VACC NO PRSV 0.5 ML IM: CPT | Performed by: OBSTETRICS & GYNECOLOGY

## 2017-10-27 PROCEDURE — 74011250636 HC RX REV CODE- 250/636: Performed by: OBSTETRICS & GYNECOLOGY

## 2017-10-27 PROCEDURE — 90471 IMMUNIZATION ADMIN: CPT

## 2017-10-27 RX ORDER — NIFEDIPINE 20 MG/1
20 CAPSULE ORAL 3 TIMES DAILY
Qty: 21 CAP | Refills: 0 | Status: SHIPPED | OUTPATIENT
Start: 2017-10-27

## 2017-10-27 RX ORDER — LANOLIN ALCOHOL/MO/W.PET/CERES
325 CREAM (GRAM) TOPICAL
Qty: 30 TAB | Refills: 3 | Status: SHIPPED | OUTPATIENT
Start: 2017-10-27

## 2017-10-27 RX ORDER — LABETALOL 200 MG/1
400 TABLET, FILM COATED ORAL EVERY 12 HOURS
Qty: 14 TAB | Refills: 1 | Status: SHIPPED | OUTPATIENT
Start: 2017-10-27

## 2017-10-27 RX ORDER — NIFEDIPINE 10 MG/1
20 CAPSULE ORAL 3 TIMES DAILY
Status: DISCONTINUED | OUTPATIENT
Start: 2017-10-27 | End: 2017-10-27 | Stop reason: HOSPADM

## 2017-10-27 RX ORDER — OXYCODONE AND ACETAMINOPHEN 5; 325 MG/1; MG/1
1-2 TABLET ORAL
Qty: 30 TAB | Refills: 0 | Status: SHIPPED | OUTPATIENT
Start: 2017-10-27

## 2017-10-27 RX ADMIN — LABETALOL HYDROCHLORIDE 400 MG: 100 TABLET, FILM COATED ORAL at 08:02

## 2017-10-27 RX ADMIN — IBUPROFEN 800 MG: 400 TABLET, FILM COATED ORAL at 11:45

## 2017-10-27 RX ADMIN — FERROUS SULFATE TAB 325 MG (65 MG ELEMENTAL FE) 325 MG: 325 (65 FE) TAB at 08:02

## 2017-10-27 RX ADMIN — INFLUENZA VIRUS VACCINE 0.5 ML: 15; 15; 15; 15 SUSPENSION INTRAMUSCULAR at 16:28

## 2017-10-27 RX ADMIN — NIFEDIPINE 20 MG: 10 CAPSULE, LIQUID FILLED ORAL at 09:20

## 2017-10-27 RX ADMIN — IBUPROFEN 800 MG: 400 TABLET, FILM COATED ORAL at 03:25

## 2017-10-27 NOTE — DISCHARGE INSTRUCTIONS

## 2017-10-27 NOTE — ROUTINE PROCESS
Mother doing well. Up without complaints. Voiding without problems. Pain managed well with Ibuprofen. Bonding well with baby.

## 2017-10-27 NOTE — PROGRESS NOTES
Post-Operative Day Number 5 Progress/Discharge Note    Patient doing well post-op day 5 from  delivery without significant complaints. Pain controlled on current medication. Voiding without difficulty, normal lochia. Denies headache, blurry vision/vision changes, RUQ pain. Vitals:  Patient Vitals for the past 8 hrs:   BP Temp Pulse Resp SpO2   10/27/17 1044 117/74 - - - -   10/27/17 0920 120/82 - (!) 102 - -   10/27/17 0802 (!) 144/101 98.1 °F (36.7 °C) 98 19 99 %   10/27/17 0400 (!) 147/103 97.8 °F (36.6 °C) (!) 109 16 100 %     Temp (24hrs), Av.9 °F (36.6 °C), Min:97.6 °F (36.4 °C), Max:98.1 °F (36.7 °C)      Vital signs stable, afebrile. Exam:  Patient without distress. Abdomen soft, fundus firm at level of umbilicus, non tender. Incision dry and  clean without erythema. Lower extremities are negative for swelling, cords or tenderness. Assessment and Plan:  Patient's post- course complicated by HTN. Labetalol increased to 400 mg daily and Procardia added, now in better control. Continue routine post-op care and maternal education. Plan discharge for today with follow up in the office in 1 week for BP check. Answered multiple questions regarding postpartum care.

## 2017-10-27 NOTE — ROUTINE PROCESS
Discharge instructions reviewed with patients, understanding verbalized of all instructions given. Time given for questions, all questions answered. Electronic signature not working at this time. Patient verbally states that she is comfortable with her discharge instructions and does not have further questions. 1812--Mom brought to front entrance in wheelchair. Discharge in stable condition.

## 2017-10-27 NOTE — ROUTINE PROCESS
Bedside and Verbal shift change report given to Fanta Spence RN  (oncoming nurse) by Roel Ennis RN (offgoing nurse). Report included the following information SBAR, Kardex, Intake/Output, MAR and Recent Results. 0800--Assessment completed. Vitals stable. Fundus firm, lochia scant. Educated patient on normal bleeding and when to call nurse for assistance.

## 2017-10-27 NOTE — DISCHARGE SUMMARY
Patient ID:  Perry Colon  [id number]  85 y.o.  1995    Admit Date: 10/22/2017    Discharge Date: 10/27/2017     Admitting Physician: Ludwig Tan DO     Admission Diagnoses: Breech birth, fetus 1 [O32.1XX1];PIH (pregnancy induced hyp*    Discharge Diagnoses: same as above      Additional Diagnoses:Present on Admission:  **None**      Historical Additional Problems: Problem List as of 10/27/2017  Date Reviewed: 10/22/2017          Codes Class Noted - Resolved    Chronic hypertension with superimposed preeclampsia ICD-10-CM: O11.9  ICD-9-CM: 642.70  10/26/2017 - Present        Anemia of mother in pregnancy, delivered with postpartum condition ICD-10-CM: O99.03  ICD-9-CM: 648.22, 285.9  10/24/2017 - Present        Postpartum hemorrhage, delivered, current hospitalization ICD-10-CM: O72.1  ICD-9-CM: 666.12  10/23/2017 - Present        Breech delivery ICD-10-CM: O32. 1XX0  ICD-9-CM: 652.21  10/22/2017 - Present        Twins ICD-10-CM: Z38.5  ICD-9-CM: V27.9  10/22/2017 - Present         labor in third trimester ICD-10-CM: O60.03  ICD-9-CM: 644.20  10/22/2017 - Present        Screening examination for venereal disease ICD-10-CM: Z11.3  ICD-9-CM: V74.5  8/3/2011 - Present        Encounter for annual health examination ICD-10-CM: Z00.00  ICD-9-CM: V70.0  8/3/2011 - Present               Procedure: Procedure(s):   SECTION       Baby link  Information for the patient's :  [facility] [[id number]]     Delivery Type:     Delivery Date: 10/22/2017   Delivery Time: 1:54 PM     Birth Weight: 4 lb 5.1 oz (1.96 kg)     Sex:  female  Delivery Clinician:  Patric Trevino   Gestational Age: Gestational Age: 32w1d    Presentation: Breech   Position:             Apgars were 8  and 9      Resuscitation Method:       Meconium Stained:    Living Status: Living       Placenta Date/Time:     Placenta Removal:     Placenta Appearance: Breech [3]     Cord Information:      Cord Events: Cord Blood Sent?:       Blood Gases Sent?:      Information for the patient's :  [facility] [[id number]]     Delivery Type:     Delivery Date: 10/22/2017   Delivery Time: 1:56 PM     Birth Weight: 5 lb 9.2 oz (2.53 kg)     Sex:  male  Delivery Clinician:      Gestational Age: Gestational Age: 32w1d    Presentation:     Position:             Apgars were 8  and 8      Resuscitation Method:       Meconium Stained:    Living Status: Living       Placenta Date/Time:     Placenta Removal:     Placenta Appearance:       Cord Information:      Cord Events:         Cord Blood Sent?:       Blood Gases Sent?:            Baby procedures:   Information for the patient's :  [facility] [[id number]]      Information for the patient's :  [facility] [[id number]]   Procedure to be Performed: circumcision      Feeding Method: Infant Feeding: Breastmilk    Immunizations: There is no immunization history for the selected administration types on file for this patient.     Group Beta Strep: No results found for: GRBSEXT     WBC   Date/Time Value Ref Range Status   10/24/2017 06:20 AM 15.4 (H) 4.6 - 13.2 K/uL Final   10/23/2017 06:19 AM 23.0 (H) 4.6 - 13.2 K/uL Final   10/22/2017 11:50 PM 22.5 (H) 4.6 - 13.2 K/uL Final     HGB   Date/Time Value Ref Range Status   10/25/2017 06:07 PM 7.4 (L) 12.0 - 16.0 g/dL Final   10/24/2017 04:50 PM 6.6 (L) 12.0 - 16.0 g/dL Final   10/24/2017 06:20 AM 5.2 (LL) 12.0 - 16.0 g/dL Final     Comment:     CALLED TO AND CORRECTLY REPEATED BY:   JAYCEE DELGADILLO RN, 3400, ON 10/24/2017 AT 0709 TO [facility]  FOLLOWING RESULTS VERIFIED BY REPETITION:       PLATELET   Date/Time Value Ref Range Status   10/24/2017 06:20  135 - 420 K/uL Final       Hospital Course: Unremarkable      Patient Vitals for the past 8 hrs:   BP Temp Pulse Resp SpO2   10/27/17 1044 117/74 - - - -   10/27/17 0920 120/82 - (!) 102 - -   10/27/17 0802 (!) 144/101 98.1 °F (36.7 °C) 98 19 99 % 10/27/17 0400 (!) 147/103 97.8 °F (36.6 °C) (!) 109 16 100 %       Temp (24hrs), Av.9 °F (36.6 °C), Min:97.6 °F (36.4 °C), Max:98.1 °F (36.7 °C)            Prenatal Labs:   No results found for: RUBELLAEXT, GRBSEXT, HBSAGEXT, HIVEXT, RPREXT, GONNOEXT, CHLAMEXT      Follow-up Appointments   Procedures    FOLLOW UP VISIT Appointment in: One Week     Standing Status:   Standing     Number of Occurrences:   1     Order Specific Question:   Appointment in     Answer:    One Week         Signed By: Monster Vance DO     2017
